# Patient Record
Sex: MALE | Race: WHITE | Employment: FULL TIME | URBAN - METROPOLITAN AREA
[De-identification: names, ages, dates, MRNs, and addresses within clinical notes are randomized per-mention and may not be internally consistent; named-entity substitution may affect disease eponyms.]

---

## 2017-01-12 PROBLEM — E78.1 HYPERTRIGLYCERIDEMIA: Status: ACTIVE | Noted: 2017-01-12

## 2017-01-12 PROBLEM — E10.9 DIABETES MELLITUS TYPE 1, CONTROLLED, INSULIN DEPENDENT (HCC): Status: ACTIVE | Noted: 2017-01-12

## 2017-01-12 PROBLEM — K21.9 GASTROESOPHAGEAL REFLUX DISEASE: Status: ACTIVE | Noted: 2017-01-12

## 2018-04-06 ENCOUNTER — OFFICE VISIT (OUTPATIENT)
Dept: FAMILY MEDICINE CLINIC | Age: 38
End: 2018-04-06
Payer: COMMERCIAL

## 2018-04-06 ENCOUNTER — HOSPITAL ENCOUNTER (OUTPATIENT)
Age: 38
Discharge: HOME | End: 2018-04-08
Payer: COMMERCIAL

## 2018-04-06 VITALS
OXYGEN SATURATION: 98 % | HEART RATE: 87 BPM | WEIGHT: 233 LBS | BODY MASS INDEX: 31.56 KG/M2 | TEMPERATURE: 98.3 F | DIASTOLIC BLOOD PRESSURE: 84 MMHG | SYSTOLIC BLOOD PRESSURE: 120 MMHG | HEIGHT: 72 IN

## 2018-04-06 DIAGNOSIS — Z13.220 LIPID SCREENING: ICD-10-CM

## 2018-04-06 DIAGNOSIS — R53.83 FATIGUE, UNSPECIFIED TYPE: ICD-10-CM

## 2018-04-06 DIAGNOSIS — R52 BODY ACHES: ICD-10-CM

## 2018-04-06 DIAGNOSIS — K21.9 GASTROESOPHAGEAL REFLUX DISEASE, ESOPHAGITIS PRESENCE NOT SPECIFIED: ICD-10-CM

## 2018-04-06 DIAGNOSIS — E10.9 DIABETES MELLITUS TYPE 1, CONTROLLED, INSULIN DEPENDENT (HCC): Primary | ICD-10-CM

## 2018-04-06 DIAGNOSIS — E10.9 DIABETES MELLITUS TYPE 1, CONTROLLED, INSULIN DEPENDENT (HCC): ICD-10-CM

## 2018-04-06 DIAGNOSIS — R07.9 CHEST PAIN, UNSPECIFIED TYPE: ICD-10-CM

## 2018-04-06 DIAGNOSIS — R63.5 WEIGHT GAIN: ICD-10-CM

## 2018-04-06 DIAGNOSIS — J30.2 CHRONIC SEASONAL ALLERGIC RHINITIS, UNSPECIFIED TRIGGER: ICD-10-CM

## 2018-04-06 LAB
ALBUMIN SERPL-MCNC: 4.9 G/DL (ref 3.5–5.2)
ALP BLD-CCNC: 102 U/L (ref 40–129)
ALT SERPL-CCNC: 34 U/L (ref 0–40)
ANION GAP SERPL CALCULATED.3IONS-SCNC: 17 MMOL/L (ref 7–16)
AST SERPL-CCNC: 29 U/L (ref 0–39)
BASOPHILS ABSOLUTE: 0.03 E9/L (ref 0–0.2)
BASOPHILS RELATIVE PERCENT: 0.5 % (ref 0–2)
BILIRUB SERPL-MCNC: 0.4 MG/DL (ref 0–1.2)
BILIRUBIN DIRECT: <0.2 MG/DL (ref 0–0.3)
BILIRUBIN, INDIRECT: NORMAL MG/DL (ref 0–1)
BUN BLDV-MCNC: 13 MG/DL (ref 6–20)
C-REACTIVE PROTEIN: 0.2 MG/DL (ref 0–0.4)
CALCIUM SERPL-MCNC: 9.8 MG/DL (ref 8.6–10.2)
CHLORIDE BLD-SCNC: 103 MMOL/L (ref 98–107)
CHOLESTEROL, TOTAL: 213 MG/DL (ref 0–199)
CO2: 23 MMOL/L (ref 22–29)
CREAT SERPL-MCNC: 0.8 MG/DL (ref 0.7–1.2)
CREATININE URINE: 73 MG/DL (ref 40–278)
EOSINOPHILS ABSOLUTE: 0.08 E9/L (ref 0.05–0.5)
EOSINOPHILS RELATIVE PERCENT: 1.4 % (ref 0–6)
FERRITIN: 126 NG/ML
GFR AFRICAN AMERICAN: >60
GFR NON-AFRICAN AMERICAN: >60 ML/MIN/1.73
GLUCOSE BLD-MCNC: 71 MG/DL (ref 74–109)
HBA1C MFR BLD: 7.9 % (ref 4.8–5.9)
HCT VFR BLD CALC: 51.2 % (ref 37–54)
HDLC SERPL-MCNC: 34 MG/DL
HEMOGLOBIN: 16.6 G/DL (ref 12.5–16.5)
IMMATURE GRANULOCYTES #: 0.01 E9/L
IMMATURE GRANULOCYTES %: 0.2 % (ref 0–5)
INFLUENZA A ANTIGEN, POC: NEGATIVE
INFLUENZA B ANTIGEN, POC: NEGATIVE
IRON SATURATION: 29 % (ref 20–55)
IRON: 110 MCG/DL (ref 59–158)
LDL CHOLESTEROL CALCULATED: 130 MG/DL (ref 0–99)
LYMPHOCYTES ABSOLUTE: 1.99 E9/L (ref 1.5–4)
LYMPHOCYTES RELATIVE PERCENT: 34.7 % (ref 20–42)
MCH RBC QN AUTO: 30.3 PG (ref 26–35)
MCHC RBC AUTO-ENTMCNC: 32.4 % (ref 32–34.5)
MCV RBC AUTO: 93.6 FL (ref 80–99.9)
MICROALBUMIN UR-MCNC: <12 MG/L
MICROALBUMIN/CREAT UR-RTO: ABNORMAL (ref 0–30)
MONOCYTES ABSOLUTE: 0.54 E9/L (ref 0.1–0.95)
MONOCYTES RELATIVE PERCENT: 9.4 % (ref 2–12)
NEUTROPHILS ABSOLUTE: 3.09 E9/L (ref 1.8–7.3)
NEUTROPHILS RELATIVE PERCENT: 53.8 % (ref 43–80)
PDW BLD-RTO: 13 FL (ref 11.5–15)
PLATELET # BLD: 305 E9/L (ref 130–450)
PMV BLD AUTO: 9.6 FL (ref 7–12)
POTASSIUM SERPL-SCNC: 4.5 MMOL/L (ref 3.5–5)
RBC # BLD: 5.47 E12/L (ref 3.8–5.8)
RHEUMATOID FACTOR: <10 IU/ML (ref 0–13)
SEDIMENTATION RATE, ERYTHROCYTE: 2 MM/HR (ref 0–15)
SODIUM BLD-SCNC: 143 MMOL/L (ref 132–146)
T4 FREE: 1.17 NG/DL (ref 0.93–1.7)
TOTAL IRON BINDING CAPACITY: 378 MCG/DL (ref 250–450)
TOTAL PROTEIN: 7.8 G/DL (ref 6.4–8.3)
TRIGL SERPL-MCNC: 247 MG/DL (ref 0–149)
TSH SERPL DL<=0.05 MIU/L-ACNC: 1.96 UIU/ML (ref 0.27–4.2)
VLDLC SERPL CALC-MCNC: 49 MG/DL
WBC # BLD: 5.7 E9/L (ref 4.5–11.5)

## 2018-04-06 PROCEDURE — 82044 UR ALBUMIN SEMIQUANTITATIVE: CPT

## 2018-04-06 PROCEDURE — 85651 RBC SED RATE NONAUTOMATED: CPT

## 2018-04-06 PROCEDURE — 82306 VITAMIN D 25 HYDROXY: CPT

## 2018-04-06 PROCEDURE — 83540 ASSAY OF IRON: CPT

## 2018-04-06 PROCEDURE — 83036 HEMOGLOBIN GLYCOSYLATED A1C: CPT

## 2018-04-06 PROCEDURE — 84443 ASSAY THYROID STIM HORMONE: CPT

## 2018-04-06 PROCEDURE — 83550 IRON BINDING TEST: CPT

## 2018-04-06 PROCEDURE — 83735 ASSAY OF MAGNESIUM: CPT

## 2018-04-06 PROCEDURE — 80053 COMPREHEN METABOLIC PANEL: CPT

## 2018-04-06 PROCEDURE — 82728 ASSAY OF FERRITIN: CPT

## 2018-04-06 PROCEDURE — 80061 LIPID PANEL: CPT

## 2018-04-06 PROCEDURE — 99214 OFFICE O/P EST MOD 30 MIN: CPT | Performed by: FAMILY MEDICINE

## 2018-04-06 PROCEDURE — 85025 COMPLETE CBC W/AUTO DIFF WBC: CPT

## 2018-04-06 PROCEDURE — 86038 ANTINUCLEAR ANTIBODIES: CPT

## 2018-04-06 PROCEDURE — 82607 VITAMIN B-12: CPT

## 2018-04-06 PROCEDURE — 86431 RHEUMATOID FACTOR QUANT: CPT

## 2018-04-06 PROCEDURE — 82248 BILIRUBIN DIRECT: CPT

## 2018-04-06 PROCEDURE — 87804 INFLUENZA ASSAY W/OPTIC: CPT | Performed by: FAMILY MEDICINE

## 2018-04-06 PROCEDURE — 86140 C-REACTIVE PROTEIN: CPT

## 2018-04-06 PROCEDURE — 36415 COLL VENOUS BLD VENIPUNCTURE: CPT | Performed by: FAMILY MEDICINE

## 2018-04-06 PROCEDURE — 84439 ASSAY OF FREE THYROXINE: CPT

## 2018-04-06 PROCEDURE — 93000 ELECTROCARDIOGRAM COMPLETE: CPT | Performed by: FAMILY MEDICINE

## 2018-04-06 PROCEDURE — 82570 ASSAY OF URINE CREATININE: CPT

## 2018-04-06 RX ORDER — ESOMEPRAZOLE MAGNESIUM 20 MG/1
20 TABLET, DELAYED RELEASE ORAL DAILY
Qty: 30 TABLET | Refills: 2 | Status: SHIPPED | OUTPATIENT
Start: 2018-04-06 | End: 2018-10-05 | Stop reason: SDUPTHER

## 2018-04-06 RX ORDER — FLUTICASONE PROPIONATE 50 MCG
1 SPRAY, SUSPENSION (ML) NASAL DAILY
Qty: 1 BOTTLE | Refills: 3 | Status: SHIPPED | OUTPATIENT
Start: 2018-04-06 | End: 2018-10-05 | Stop reason: SDUPTHER

## 2018-04-06 ASSESSMENT — PATIENT HEALTH QUESTIONNAIRE - PHQ9
SUM OF ALL RESPONSES TO PHQ QUESTIONS 1-9: 0
2. FEELING DOWN, DEPRESSED OR HOPELESS: 0
SUM OF ALL RESPONSES TO PHQ9 QUESTIONS 1 & 2: 0
1. LITTLE INTEREST OR PLEASURE IN DOING THINGS: 0

## 2018-04-09 DIAGNOSIS — E78.2 MIXED HYPERLIPIDEMIA: Primary | ICD-10-CM

## 2018-04-09 DIAGNOSIS — M79.10 MYALGIA: Primary | ICD-10-CM

## 2018-04-09 LAB — ANTI-NUCLEAR ANTIBODY (ANA): NEGATIVE

## 2018-04-10 DIAGNOSIS — E55.9 VITAMIN D DEFICIENCY: Primary | ICD-10-CM

## 2018-04-10 LAB
MAGNESIUM: 2.5 MG/DL (ref 1.6–2.6)
VITAMIN B-12: 627 PG/ML (ref 211–946)
VITAMIN D 25-HYDROXY: 29 NG/ML (ref 30–100)

## 2018-04-26 ENCOUNTER — TELEPHONE (OUTPATIENT)
Dept: FAMILY MEDICINE CLINIC | Age: 38
End: 2018-04-26

## 2018-05-16 ENCOUNTER — TELEPHONE (OUTPATIENT)
Dept: FAMILY MEDICINE CLINIC | Age: 38
End: 2018-05-16

## 2018-07-18 ENCOUNTER — TELEPHONE (OUTPATIENT)
Dept: FAMILY MEDICINE CLINIC | Age: 38
End: 2018-07-18

## 2018-07-19 ENCOUNTER — HOSPITAL ENCOUNTER (EMERGENCY)
Age: 38
Discharge: HOME | End: 2018-07-19
Payer: COMMERCIAL

## 2018-07-19 VITALS
HEART RATE: 106 BPM | RESPIRATION RATE: 18 BRPM | HEIGHT: 72 IN | DIASTOLIC BLOOD PRESSURE: 80 MMHG | WEIGHT: 225 LBS | OXYGEN SATURATION: 96 % | BODY MASS INDEX: 30.48 KG/M2 | SYSTOLIC BLOOD PRESSURE: 127 MMHG | TEMPERATURE: 98.5 F

## 2018-07-19 DIAGNOSIS — S41.112A LACERATION OF LEFT UPPER EXTREMITY, INITIAL ENCOUNTER: Primary | ICD-10-CM

## 2018-07-19 PROCEDURE — 99282 EMERGENCY DEPT VISIT SF MDM: CPT

## 2018-07-19 PROCEDURE — 12001 RPR S/N/AX/GEN/TRNK 2.5CM/<: CPT

## 2018-07-19 ASSESSMENT — PAIN DESCRIPTION - PAIN TYPE: TYPE: ACUTE PAIN

## 2018-07-19 ASSESSMENT — PAIN DESCRIPTION - ORIENTATION: ORIENTATION: LEFT

## 2018-07-19 ASSESSMENT — PAIN DESCRIPTION - LOCATION: LOCATION: ARM

## 2018-07-20 NOTE — ED PROVIDER NOTES
[facility]      Department of Emergency Medicine  ED Provider Note  Admit Date: 7/19/2018  Room: [facility]            HPI:  7/19/18, Time: 10:06 PM  .       Hayley Paulino is a 34 y.o. old male presenting to the emergency department for a laceration to the left forearm, caused by metal edge, which occured 1 hour(s) prior to arrival. There is not a possibility of retained foreign body in the affected area. The patients tetanus status is within past 5 years. Bleeding is  controlled. There is no pain at injury site. The injury was work related. Review of Systems:   Pertinent positives and negatives are stated within HPI, all other systems reviewed and are negative.          --------------------------------------------- PAST HISTORY ---------------------------------------------  Past Medical History:  has a past medical history of Diabetes mellitus ([facility] [address] Past Surgical History:  has a past surgical history that includes Cholecystectomy. Social History:  reports that he has never smoked. His smokeless tobacco use includes Chew. He reports that he drinks alcohol. He reports that he does not use drugs. Family History: family history includes Diabetes in his father; High Blood Pressure in his father. The patients home medications have been reviewed. Allergies: Patient has no known allergies. -------------------------------------------------- RESULTS -------------------------------------------------  All laboratory and radiology results have been personally reviewed by myself   LABS:  No results found for this visit on 07/19/18. RADIOLOGY:  Interpreted by Radiologist.  No orders to display       ------------------------- NURSING NOTES AND VITALS REVIEWED ---------------------------   The nursing notes within the ED encounter and vital signs as below have been reviewed.    /80   Pulse 106   Temp 98.5 °F (36.9 °C)   Resp 18   Ht 6' (1.829 m)   Wt 225 lb (102.1 kg)   SpO2 96% advised to keep the wound clean and dry. He is to wash the wound twice a day with soap and water. He is to keep it covered at all times while at work. Return to the emergency department with any purulent drainage from the wound, surrounding redness, or lymphangitic streaking. Counseling: The emergency provider has spoken with the patient and discussed todays results, in addition to providing specific details for the plan of care and counseling regarding the diagnosis and prognosis. Questions are answered at this time and they are agreeable with the plan.      --------------------------------- IMPRESSION AND DISPOSITION ---------------------------------    IMPRESSION  1.  Laceration of left upper extremity, initial encounter        DISPOSITION  Disposition: Discharge to home  Patient condition is good         Evan Sosa, [place]  07/19/18 9170

## 2018-08-09 ENCOUNTER — TELEPHONE (OUTPATIENT)
Dept: FAMILY MEDICINE CLINIC | Age: 38
End: 2018-08-09

## 2018-08-13 ENCOUNTER — TELEPHONE (OUTPATIENT)
Dept: FAMILY MEDICINE CLINIC | Age: 38
End: 2018-08-13

## 2018-08-13 NOTE — TELEPHONE ENCOUNTER
Spoke with patient - due for labs- he is currently in [facility] and will call us to schedule an appt when he gets back to [facility].

## 2018-09-28 ENCOUNTER — TELEPHONE (OUTPATIENT)
Dept: FAMILY MEDICINE CLINIC | Age: 38
End: 2018-09-28

## 2018-10-05 ENCOUNTER — OFFICE VISIT (OUTPATIENT)
Dept: FAMILY MEDICINE CLINIC | Age: 38
End: 2018-10-05
Payer: COMMERCIAL

## 2018-10-05 ENCOUNTER — HOSPITAL ENCOUNTER (OUTPATIENT)
Age: 38
Discharge: HOME | End: 2018-10-07
Payer: COMMERCIAL

## 2018-10-05 VITALS
OXYGEN SATURATION: 98 % | WEIGHT: 227 LBS | HEIGHT: 72 IN | SYSTOLIC BLOOD PRESSURE: 122 MMHG | TEMPERATURE: 98.2 F | DIASTOLIC BLOOD PRESSURE: 76 MMHG | HEART RATE: 82 BPM | BODY MASS INDEX: 30.75 KG/M2

## 2018-10-05 DIAGNOSIS — Z23 NEED FOR INFLUENZA VACCINATION: ICD-10-CM

## 2018-10-05 DIAGNOSIS — E10.9 DIABETES MELLITUS TYPE 1, CONTROLLED, INSULIN DEPENDENT (HCC): ICD-10-CM

## 2018-10-05 DIAGNOSIS — Z23 NEED FOR VACCINATION: ICD-10-CM

## 2018-10-05 DIAGNOSIS — L30.9 DERMATITIS: Primary | ICD-10-CM

## 2018-10-05 DIAGNOSIS — K21.9 GASTROESOPHAGEAL REFLUX DISEASE, ESOPHAGITIS PRESENCE NOT SPECIFIED: ICD-10-CM

## 2018-10-05 DIAGNOSIS — E78.2 MIXED HYPERLIPIDEMIA: ICD-10-CM

## 2018-10-05 DIAGNOSIS — E11.9 ENCOUNTER FOR DIABETIC FOOT EXAM (HCC): ICD-10-CM

## 2018-10-05 DIAGNOSIS — J31.0 CHRONIC RHINITIS: ICD-10-CM

## 2018-10-05 LAB
ALBUMIN SERPL-MCNC: 4.7 G/DL (ref 3.5–5.2)
ALP BLD-CCNC: 127 U/L (ref 40–129)
ALT SERPL-CCNC: 22 U/L (ref 0–40)
ANION GAP SERPL CALCULATED.3IONS-SCNC: 17 MMOL/L (ref 7–16)
AST SERPL-CCNC: 31 U/L (ref 0–39)
BILIRUB SERPL-MCNC: 0.4 MG/DL (ref 0–1.2)
BILIRUBIN DIRECT: <0.2 MG/DL (ref 0–0.3)
BILIRUBIN, INDIRECT: NORMAL MG/DL (ref 0–1)
BUN BLDV-MCNC: 9 MG/DL (ref 6–20)
CALCIUM SERPL-MCNC: 9.9 MG/DL (ref 8.6–10.2)
CHLORIDE BLD-SCNC: 98 MMOL/L (ref 98–107)
CHOLESTEROL, TOTAL: 169 MG/DL (ref 0–199)
CO2: 24 MMOL/L (ref 22–29)
CREAT SERPL-MCNC: 0.8 MG/DL (ref 0.7–1.2)
CREATININE URINE: 77 MG/DL (ref 40–278)
GFR AFRICAN AMERICAN: >60
GFR NON-AFRICAN AMERICAN: >60 ML/MIN/1.73
GLUCOSE BLD-MCNC: 96 MG/DL (ref 74–109)
HBA1C MFR BLD: 7.9 % (ref 4–5.6)
HDLC SERPL-MCNC: 30 MG/DL
LDL CHOLESTEROL CALCULATED: 98 MG/DL (ref 0–99)
MICROALBUMIN UR-MCNC: <12 MG/L
MICROALBUMIN/CREAT UR-RTO: ABNORMAL (ref 0–30)
POTASSIUM SERPL-SCNC: 4.9 MMOL/L (ref 3.5–5)
SODIUM BLD-SCNC: 139 MMOL/L (ref 132–146)
TOTAL PROTEIN: 7.7 G/DL (ref 6.4–8.3)
TRIGL SERPL-MCNC: 204 MG/DL (ref 0–149)
VLDLC SERPL CALC-MCNC: 41 MG/DL

## 2018-10-05 PROCEDURE — 90471 IMMUNIZATION ADMIN: CPT | Performed by: FAMILY MEDICINE

## 2018-10-05 PROCEDURE — 90686 IIV4 VACC NO PRSV 0.5 ML IM: CPT | Performed by: FAMILY MEDICINE

## 2018-10-05 PROCEDURE — 80048 BASIC METABOLIC PNL TOTAL CA: CPT

## 2018-10-05 PROCEDURE — 83036 HEMOGLOBIN GLYCOSYLATED A1C: CPT

## 2018-10-05 PROCEDURE — 82570 ASSAY OF URINE CREATININE: CPT

## 2018-10-05 PROCEDURE — 80061 LIPID PANEL: CPT

## 2018-10-05 PROCEDURE — 82044 UR ALBUMIN SEMIQUANTITATIVE: CPT

## 2018-10-05 PROCEDURE — 99214 OFFICE O/P EST MOD 30 MIN: CPT | Performed by: FAMILY MEDICINE

## 2018-10-05 PROCEDURE — 80076 HEPATIC FUNCTION PANEL: CPT

## 2018-10-05 RX ORDER — ESOMEPRAZOLE MAGNESIUM 20 MG/1
20 TABLET, DELAYED RELEASE ORAL DAILY
Qty: 30 TABLET | Refills: 2 | Status: SHIPPED | OUTPATIENT
Start: 2018-10-05 | End: 2019-10-04 | Stop reason: DRUGHIGH

## 2018-10-05 RX ORDER — FLUTICASONE PROPIONATE 50 MCG
SPRAY, SUSPENSION (ML) NASAL
Qty: 1 BOTTLE | Refills: 3 | Status: SHIPPED | OUTPATIENT
Start: 2018-10-05 | End: 2019-10-04 | Stop reason: SDUPTHER

## 2018-10-05 RX ORDER — BLOOD-GLUCOSE SENSOR
EACH MISCELLANEOUS
Qty: 10 EACH | Refills: 1 | Status: CANCELLED | OUTPATIENT
Start: 2018-10-05

## 2018-10-05 NOTE — PROGRESS NOTES
dependent ([facility]  -     blood glucose test strips (ASCENSIA AUTODISC VI;ONE TOUCH ULTRA TEST VI) strip; Test strips to use 8-10 times a day for control of type 1 brittle diabetes  -     insulin lispro (HUMALOG) 100 UNIT/ML injection vial; use as directed for insulin pump-2. 5units/hr plus 4 to 5 boluses thru the day  -     [facility]- Miladys eG MD  -     Microalbumin / Creatinine Urine Ratio; Future  -     Hemoglobin A1C; Future    Need for influenza vaccination  -     INFLUENZA, QUADV, 3 YRS AND OLDER, IM, PF, PREFILL SYR OR SDV, 0.5ML (FLUZONE QUADV, PF)    Mixed hyperlipidemia  -     Basic Metabolic Panel; Future  -     Hepatic Function Panel; Future  -     Lipid Panel; Future    Encounter for diabetic foot exam ([facility]  -     Diabetic Foot Exam      Pt agreed to see endocrine   Patient states he will not agree to start a statin -he did agree to lipid testing today with labs   Return in 6 mn pending lab results. Plan discussed with the patient and his wife and they were in agreement with the plan of care. Bogdan ADAN

## 2018-11-13 DIAGNOSIS — E10.9 DIABETES MELLITUS TYPE 1, CONTROLLED, INSULIN DEPENDENT (HCC): ICD-10-CM

## 2018-11-13 NOTE — TELEPHONE ENCOUNTER
These got sent to mail away pharmacy, he always gets them at  in [place], can we please send to [facility]?

## 2019-02-05 ENCOUNTER — OFFICE VISIT (OUTPATIENT)
Dept: PRIMARY CARE CLINIC | Age: 39
End: 2019-02-05
Payer: COMMERCIAL

## 2019-02-05 VITALS
SYSTOLIC BLOOD PRESSURE: 110 MMHG | HEIGHT: 72 IN | DIASTOLIC BLOOD PRESSURE: 82 MMHG | TEMPERATURE: 97.7 F | BODY MASS INDEX: 31.97 KG/M2 | OXYGEN SATURATION: 97 % | HEART RATE: 88 BPM | WEIGHT: 236 LBS

## 2019-02-05 DIAGNOSIS — J40 BRONCHITIS: Primary | ICD-10-CM

## 2019-02-05 PROCEDURE — 99213 OFFICE O/P EST LOW 20 MIN: CPT | Performed by: PHYSICIAN ASSISTANT

## 2019-02-05 RX ORDER — GUAIFENESIN AND CODEINE PHOSPHATE 100; 10 MG/5ML; MG/5ML
5 SOLUTION ORAL EVERY 6 HOURS PRN
Qty: 180 ML | Refills: 0 | Status: SHIPPED | OUTPATIENT
Start: 2019-02-05 | End: 2019-02-12

## 2019-02-05 RX ORDER — PREDNISONE 20 MG/1
20 TABLET ORAL 2 TIMES DAILY
Qty: 10 TABLET | Refills: 0 | Status: SHIPPED | OUTPATIENT
Start: 2019-02-05 | End: 2019-02-10

## 2019-02-05 RX ORDER — DOXYCYCLINE HYCLATE 100 MG
100 TABLET ORAL 2 TIMES DAILY
Qty: 20 TABLET | Refills: 0 | Status: SHIPPED | OUTPATIENT
Start: 2019-02-05 | End: 2019-02-15

## 2019-04-12 ENCOUNTER — OFFICE VISIT (OUTPATIENT)
Dept: PRIMARY CARE CLINIC | Age: 39
End: 2019-04-12
Payer: COMMERCIAL

## 2019-04-12 VITALS
TEMPERATURE: 97.2 F | OXYGEN SATURATION: 98 % | RESPIRATION RATE: 16 BRPM | DIASTOLIC BLOOD PRESSURE: 82 MMHG | WEIGHT: 235 LBS | BODY MASS INDEX: 31.87 KG/M2 | HEART RATE: 107 BPM | SYSTOLIC BLOOD PRESSURE: 138 MMHG

## 2019-04-12 DIAGNOSIS — E55.9 VITAMIN D DEFICIENCY: ICD-10-CM

## 2019-04-12 DIAGNOSIS — E10.9 DIABETES MELLITUS TYPE 1, CONTROLLED, INSULIN DEPENDENT (HCC): Primary | ICD-10-CM

## 2019-04-12 DIAGNOSIS — E78.2 MIXED HYPERLIPIDEMIA: ICD-10-CM

## 2019-04-12 DIAGNOSIS — K21.9 GASTROESOPHAGEAL REFLUX DISEASE, ESOPHAGITIS PRESENCE NOT SPECIFIED: ICD-10-CM

## 2019-04-12 PROCEDURE — 99214 OFFICE O/P EST MOD 30 MIN: CPT | Performed by: PHYSICIAN ASSISTANT

## 2019-04-12 ASSESSMENT — PATIENT HEALTH QUESTIONNAIRE - PHQ9
SUM OF ALL RESPONSES TO PHQ QUESTIONS 1-9: 0
SUM OF ALL RESPONSES TO PHQ QUESTIONS 1-9: 0
2. FEELING DOWN, DEPRESSED OR HOPELESS: 0
SUM OF ALL RESPONSES TO PHQ9 QUESTIONS 1 & 2: 0
1. LITTLE INTEREST OR PLEASURE IN DOING THINGS: 0

## 2019-04-12 NOTE — PROGRESS NOTES
Scribner Bodily  1980 4/12/19      HPI:    Patient presents for T1DM check up. Last A1c was 7.9%. Does have Hx of retinopathy in R eye, sees vitreoretinal specialist routinely. No neuropathy, no nephropathy. Checking his sugars he states around 20 times a day. Basal 2.10 units/hr, and bolus he typically will take 2.5 units per 15 grams of carbohydrates, with ISF of ?. He isn't sure. He isn't sure how to show me on his pump, but feels it may be around 1 unit per 50 points. He would like a new pump, he has [employer] mini[employer], current one is 3years old. State it will malfunction at times, and he is physical with his job. He has increased his basal insulin on a small increments, as he states at times his sugars are a little more high in AM, have been in 200's lately. States at times they do go lower in 130s, notes since illness they have been a little high, but again is working on setting. He is compliant with his pump, but states not so much with diet. He is on PPI for GERD, takes OTC Nexium, controls his Sx. He is also on flonase, QD for allergies and it helps. Hx of Vit D deficiency, he hasn't been on it. Past Medical History:   Diagnosis Date    Diabetes mellitus ([address]     Diabetic retinopathy ([facility] [address]         Past Surgical History:   Procedure Laterality Date    CHOLECYSTECTOMY         Current Outpatient Medications   Medication Sig Dispense Refill    blood glucose test strips (ASCENSIA AUTODISC VI;ONE TOUCH ULTRA TEST VI) strip Test strips to use 8-10 times a day for control of type 1 brittle diabetes 900 each 2    Esomeprazole Magnesium 20 MG TBEC Take 20 mg by mouth daily 30 tablet 2    fluticasone (FLONASE) 50 MCG/ACT nasal spray One spray into each nostril once daily 1 Bottle 3    insulin lispro (HUMALOG) 100 UNIT/ML injection vial use as directed for insulin pump-2. 5units/hr plus 4 to 5 boluses thru the day 16 vial 2    Continuous Glucose Monitor Sup (ENLITE GLUCOSE SENSOR) MISC as directed q3 days dx: e11.9 10 each 1    Insulin Infusion Pump KIT by Does not apply route       No current facility-administered medications for this visit. No Known Allergies    Family History   Problem Relation Age of Onset    High Blood Pressure Father     Diabetes Father     Diabetes Sister     Liver Disease Maternal Grandmother         cirrhosis, nonalcoholic    Cancer Paternal Grandmother         lung, smoker       Social History     Socioeconomic History    Marital status:      Spouse name: Not on file    Number of children: Not on file    Years of education: Not on file    Highest education level: Not on file   Occupational History    Not on file   Social Needs    Financial resource strain: Not on file    Food insecurity:     Worry: Not on file     Inability: Not on file    Transportation needs:     Medical: Not on file     Non-medical: Not on file   Tobacco Use    Smoking status: Never Smoker    Smokeless tobacco: Current User     Types: Chew   Substance and Sexual Activity    Alcohol use: Yes     Comment: rare    Drug use: No    Sexual activity: Not on file   Lifestyle    Physical activity:     Days per week: Not on file     Minutes per session: Not on file    Stress: Not on file   Relationships    Social connections:     Talks on phone: Not on file     Gets together: Not on file     Attends Orthodox service: Not on file     Active member of club or organization: Not on file     Attends meetings of clubs or organizations: Not on file     Relationship status: Not on file    Intimate partner violence:     Fear of current or ex partner: Not on file     Emotionally abused: Not on file     Physically abused: Not on file     Forced sexual activity: Not on file   Other Topics Concern    Not on file   Social History Narrative     [place] [employer], , 3 children.         Review of Systems :    Constitutional: Negative for fatigue, malaise, fever, chills, to the medical problem. Physical Exam:   Vitals:    04/12/19 1627   BP: 138/82   Pulse: 107   Resp: 16   Temp: 97.2 °F (36.2 °C)   SpO2: 98%   Weight: 235 lb (106.6 kg)     Constitutional:  A and O x 3, in NAD. Afebrile. Appears stated age. Head:  NCAT. Eyes:  PERRLA, EOMI without nystagmus. Conjunctiva normal. Sclera anicteric. Ears:  External ears without lesions. TM's clear bilaterally. Throat:  Pharynx without lesions. Airway patient. Mucous membranes pink and moist without lesions. Neck:  Supple. Nontender, no lymphadenopathy noted, no thyromegaly. Lungs:  Clear to auscultation and breath sounds equal.  Heart:  Regular rate and rhythm, normal S1 and S2, no m/r/g.  UE and LE distal pulses intact. Abdomen:  Soft, NTND, NABS x 4, no masses or organomegaly, no rebound or guarding. MS: Normal, painless range of motion. No neurovascular deficit. 5/5 strength in UE's/LE's/ bilaterally. Skin:  No abrasions, ecchymoses, or lacerations unless noted elsewhere. Extremities  No cyanosis, clubbing, or edema noted. Neurological:   Oriented. Motor functions intact. DTRs UE and LE intact. Assessment/Plan:     Yair Ramon was seen today for diabetes. Diagnoses and all orders for this visit:    Diabetes mellitus type 1, controlled, insulin dependent ([facility] [address]  -     COMPREHENSIVE METABOLIC PANEL; Future  -     HEMOGLOBIN A1C; Future    Mixed hyperlipidemia  -     COMPREHENSIVE METABOLIC PANEL; Future  -     LIPID PANEL; Future    Gastroesophageal reflux disease, esophagitis presence not specified    Vitamin D deficiency  -     Vitamin D 25 Hydrox, D2 & D3; Future    continue pump settings the same for now, try and keep a log of sugars. Will get all labs as above, and await results. Return in about 6 months (around 10/12/2019). sooner if needed. Counseled regarding above diagnosis, including possible risks and complications, especially if left uncontrolled.      Counseled

## 2019-04-18 ENCOUNTER — HOSPITAL ENCOUNTER (OUTPATIENT)
Age: 39
Discharge: HOME | End: 2019-04-20
Payer: COMMERCIAL

## 2019-04-18 ENCOUNTER — NURSE ONLY (OUTPATIENT)
Dept: PRIMARY CARE CLINIC | Age: 39
End: 2019-04-18

## 2019-04-18 DIAGNOSIS — E78.2 MIXED HYPERLIPIDEMIA: Primary | ICD-10-CM

## 2019-04-18 DIAGNOSIS — E55.9 VITAMIN D DEFICIENCY: ICD-10-CM

## 2019-04-18 DIAGNOSIS — E78.2 MIXED HYPERLIPIDEMIA: ICD-10-CM

## 2019-04-18 DIAGNOSIS — E10.9 DIABETES MELLITUS TYPE 1, CONTROLLED, INSULIN DEPENDENT (HCC): ICD-10-CM

## 2019-04-18 LAB
ALBUMIN SERPL-MCNC: 4.6 G/DL (ref 3.5–5.2)
ALP BLD-CCNC: 110 U/L (ref 40–129)
ALT SERPL-CCNC: 24 U/L (ref 0–40)
ANION GAP SERPL CALCULATED.3IONS-SCNC: 14 MMOL/L (ref 7–16)
AST SERPL-CCNC: 24 U/L (ref 0–39)
BILIRUB SERPL-MCNC: 0.3 MG/DL (ref 0–1.2)
BUN BLDV-MCNC: 12 MG/DL (ref 6–20)
CALCIUM SERPL-MCNC: 9.5 MG/DL (ref 8.6–10.2)
CHLORIDE BLD-SCNC: 102 MMOL/L (ref 98–107)
CHOLESTEROL, TOTAL: 168 MG/DL (ref 0–199)
CO2: 24 MMOL/L (ref 22–29)
CREAT SERPL-MCNC: 0.8 MG/DL (ref 0.7–1.2)
GFR AFRICAN AMERICAN: >60
GFR NON-AFRICAN AMERICAN: >60 ML/MIN/1.73
GLUCOSE BLD-MCNC: 169 MG/DL (ref 74–99)
HBA1C MFR BLD: 7.8 % (ref 4–5.6)
HDLC SERPL-MCNC: 29 MG/DL
LDL CHOLESTEROL CALCULATED: 92 MG/DL (ref 0–99)
POTASSIUM SERPL-SCNC: 4.2 MMOL/L (ref 3.5–5)
SODIUM BLD-SCNC: 140 MMOL/L (ref 132–146)
TOTAL PROTEIN: 7.4 G/DL (ref 6.4–8.3)
TRIGL SERPL-MCNC: 236 MG/DL (ref 0–149)
VITAMIN D 25-HYDROXY: 49 NG/ML (ref 30–100)
VLDLC SERPL CALC-MCNC: 47 MG/DL

## 2019-04-18 PROCEDURE — 82306 VITAMIN D 25 HYDROXY: CPT

## 2019-04-18 PROCEDURE — 80061 LIPID PANEL: CPT

## 2019-04-18 PROCEDURE — 83036 HEMOGLOBIN GLYCOSYLATED A1C: CPT

## 2019-04-18 PROCEDURE — 80053 COMPREHEN METABOLIC PANEL: CPT

## 2019-04-23 ENCOUNTER — TELEPHONE (OUTPATIENT)
Dept: PRIMARY CARE CLINIC | Age: 39
End: 2019-04-23

## 2019-04-23 RX ORDER — ICOSAPENT ETHYL 1000 MG/1
2 CAPSULE ORAL 2 TIMES DAILY
Qty: 120 CAPSULE | Refills: 5 | Status: SHIPPED | OUTPATIENT
Start: 2019-04-23 | End: 2019-07-10

## 2019-04-23 NOTE — TELEPHONE ENCOUNTER
OK.  I actually set aside samples for him to take, it will be 2 capsules BID, and then a scripts with a copay card for decreased copay in case it should be costly. He can  at the pharmacy.

## 2019-06-04 RX ORDER — DOXYCYCLINE HYCLATE 100 MG
100 TABLET ORAL 2 TIMES DAILY
Qty: 20 TABLET | Refills: 0 | Status: SHIPPED | OUTPATIENT
Start: 2019-06-04 | End: 2019-06-14

## 2019-07-10 ENCOUNTER — TELEPHONE (OUTPATIENT)
Dept: ADMINISTRATIVE | Age: 39
End: 2019-07-10

## 2019-07-10 ENCOUNTER — APPOINTMENT (OUTPATIENT)
Dept: GENERAL RADIOLOGY | Age: 39
End: 2019-07-10
Payer: COMMERCIAL

## 2019-07-10 ENCOUNTER — HOSPITAL ENCOUNTER (EMERGENCY)
Age: 39
Discharge: HOME | End: 2019-07-11
Attending: EMERGENCY MEDICINE
Payer: COMMERCIAL

## 2019-07-10 DIAGNOSIS — K21.9 GASTROESOPHAGEAL REFLUX DISEASE, ESOPHAGITIS PRESENCE NOT SPECIFIED: Primary | ICD-10-CM

## 2019-07-10 DIAGNOSIS — R07.89 ATYPICAL CHEST PAIN: ICD-10-CM

## 2019-07-10 LAB
ALBUMIN SERPL-MCNC: 4.7 G/DL (ref 3.5–5.2)
ALP BLD-CCNC: 120 U/L (ref 40–129)
ALT SERPL-CCNC: 29 U/L (ref 0–40)
ANION GAP SERPL CALCULATED.3IONS-SCNC: 11 MMOL/L (ref 7–16)
AST SERPL-CCNC: 23 U/L (ref 0–39)
BASOPHILS ABSOLUTE: 0.03 E9/L (ref 0–0.2)
BASOPHILS RELATIVE PERCENT: 0.4 % (ref 0–2)
BILIRUB SERPL-MCNC: 0.3 MG/DL (ref 0–1.2)
BILIRUBIN DIRECT: <0.2 MG/DL (ref 0–0.3)
BILIRUBIN URINE: NEGATIVE
BILIRUBIN, INDIRECT: NORMAL MG/DL (ref 0–1)
BLOOD, URINE: NEGATIVE
BUN BLDV-MCNC: 13 MG/DL (ref 6–20)
CALCIUM SERPL-MCNC: 9.5 MG/DL (ref 8.6–10.2)
CHLORIDE BLD-SCNC: 102 MMOL/L (ref 98–107)
CLARITY: CLEAR
CO2: 25 MMOL/L (ref 22–29)
COLOR: YELLOW
CREAT SERPL-MCNC: 0.9 MG/DL (ref 0.7–1.2)
D DIMER: <200 NG/ML DDU
EOSINOPHILS ABSOLUTE: 0.09 E9/L (ref 0.05–0.5)
EOSINOPHILS RELATIVE PERCENT: 1.2 % (ref 0–6)
GFR AFRICAN AMERICAN: >60
GFR NON-AFRICAN AMERICAN: >60 ML/MIN/1.73
GLUCOSE BLD-MCNC: 214 MG/DL (ref 74–99)
GLUCOSE URINE: 100 MG/DL
HCT VFR BLD CALC: 45.5 % (ref 37–54)
HEMOGLOBIN: 15.6 G/DL (ref 12.5–16.5)
IMMATURE GRANULOCYTES #: 0.02 E9/L
IMMATURE GRANULOCYTES %: 0.3 % (ref 0–5)
KETONES, URINE: NEGATIVE MG/DL
LEUKOCYTE ESTERASE, URINE: NEGATIVE
LIPASE: 12 U/L (ref 13–60)
LYMPHOCYTES ABSOLUTE: 2.06 E9/L (ref 1.5–4)
LYMPHOCYTES RELATIVE PERCENT: 26.6 % (ref 20–42)
MCH RBC QN AUTO: 31.6 PG (ref 26–35)
MCHC RBC AUTO-ENTMCNC: 34.3 % (ref 32–34.5)
MCV RBC AUTO: 92.1 FL (ref 80–99.9)
MONOCYTES ABSOLUTE: 0.51 E9/L (ref 0.1–0.95)
MONOCYTES RELATIVE PERCENT: 6.6 % (ref 2–12)
NEUTROPHILS ABSOLUTE: 5.02 E9/L (ref 1.8–7.3)
NEUTROPHILS RELATIVE PERCENT: 64.9 % (ref 43–80)
NITRITE, URINE: NEGATIVE
PDW BLD-RTO: 13.2 FL (ref 11.5–15)
PH UA: 7 (ref 5–9)
PLATELET # BLD: 315 E9/L (ref 130–450)
PMV BLD AUTO: 9 FL (ref 7–12)
POTASSIUM SERPL-SCNC: 4.6 MMOL/L (ref 3.5–5)
PROTEIN UA: NEGATIVE MG/DL
RBC # BLD: 4.94 E12/L (ref 3.8–5.8)
SODIUM BLD-SCNC: 138 MMOL/L (ref 132–146)
SPECIFIC GRAVITY UA: 1.01 (ref 1–1.03)
TOTAL PROTEIN: 7.5 G/DL (ref 6.4–8.3)
TROPONIN: <0.01 NG/ML (ref 0–0.03)
UROBILINOGEN, URINE: 0.2 E.U./DL
WBC # BLD: 7.7 E9/L (ref 4.5–11.5)

## 2019-07-10 PROCEDURE — 2580000003 HC RX 258: Performed by: EMERGENCY MEDICINE

## 2019-07-10 PROCEDURE — 71045 X-RAY EXAM CHEST 1 VIEW: CPT

## 2019-07-10 PROCEDURE — 80076 HEPATIC FUNCTION PANEL: CPT

## 2019-07-10 PROCEDURE — 6370000000 HC RX 637 (ALT 250 FOR IP): Performed by: EMERGENCY MEDICINE

## 2019-07-10 PROCEDURE — 81003 URINALYSIS AUTO W/O SCOPE: CPT

## 2019-07-10 PROCEDURE — 85025 COMPLETE CBC W/AUTO DIFF WBC: CPT

## 2019-07-10 PROCEDURE — 83690 ASSAY OF LIPASE: CPT

## 2019-07-10 PROCEDURE — 84484 ASSAY OF TROPONIN QUANT: CPT

## 2019-07-10 PROCEDURE — 99285 EMERGENCY DEPT VISIT HI MDM: CPT

## 2019-07-10 PROCEDURE — 80048 BASIC METABOLIC PNL TOTAL CA: CPT

## 2019-07-10 PROCEDURE — 85378 FIBRIN DEGRADE SEMIQUANT: CPT

## 2019-07-10 PROCEDURE — 36415 COLL VENOUS BLD VENIPUNCTURE: CPT

## 2019-07-10 RX ORDER — NITROGLYCERIN 0.4 MG/1
0.4 TABLET SUBLINGUAL EVERY 5 MIN PRN
Status: DISCONTINUED | OUTPATIENT
Start: 2019-07-10 | End: 2019-07-11 | Stop reason: HOSPADM

## 2019-07-10 RX ORDER — 0.9 % SODIUM CHLORIDE 0.9 %
500 INTRAVENOUS SOLUTION INTRAVENOUS ONCE
Status: COMPLETED | OUTPATIENT
Start: 2019-07-10 | End: 2019-07-10

## 2019-07-10 RX ORDER — ASPIRIN 81 MG/1
324 TABLET, CHEWABLE ORAL ONCE
Status: COMPLETED | OUTPATIENT
Start: 2019-07-10 | End: 2019-07-10

## 2019-07-10 RX ADMIN — ASPIRIN 81 MG 324 MG: 81 TABLET ORAL at 20:40

## 2019-07-10 RX ADMIN — SODIUM CHLORIDE 500 ML: 9 INJECTION, SOLUTION INTRAVENOUS at 20:40

## 2019-07-11 VITALS
WEIGHT: 230 LBS | HEIGHT: 72 IN | OXYGEN SATURATION: 98 % | HEART RATE: 84 BPM | RESPIRATION RATE: 16 BRPM | TEMPERATURE: 98.6 F | BODY MASS INDEX: 31.15 KG/M2 | SYSTOLIC BLOOD PRESSURE: 135 MMHG | DIASTOLIC BLOOD PRESSURE: 79 MMHG

## 2019-07-11 LAB — TROPONIN: <0.01 NG/ML (ref 0–0.03)

## 2019-07-11 PROCEDURE — 84484 ASSAY OF TROPONIN QUANT: CPT

## 2019-07-11 PROCEDURE — 6370000000 HC RX 637 (ALT 250 FOR IP): Performed by: EMERGENCY MEDICINE

## 2019-07-11 RX ORDER — LIDOCAINE HYDROCHLORIDE 20 MG/ML
SOLUTION OROPHARYNGEAL
Status: DISCONTINUED
Start: 2019-07-11 | End: 2019-07-11 | Stop reason: HOSPADM

## 2019-07-11 RX ORDER — MAGNESIUM HYDROXIDE/ALUMINUM HYDROXICE/SIMETHICONE 120; 1200; 1200 MG/30ML; MG/30ML; MG/30ML
SUSPENSION ORAL
Status: DISCONTINUED
Start: 2019-07-11 | End: 2019-07-11 | Stop reason: HOSPADM

## 2019-07-11 RX ADMIN — LIDOCAINE HYDROCHLORIDE: 20 SOLUTION ORAL; TOPICAL at 00:55

## 2019-07-11 ASSESSMENT — PAIN SCALES - GENERAL: PAINLEVEL_OUTOF10: 2

## 2019-07-11 ASSESSMENT — PAIN DESCRIPTION - PROGRESSION: CLINICAL_PROGRESSION: GRADUALLY IMPROVING

## 2019-07-14 LAB
EKG ATRIAL RATE: 98 BPM
EKG P AXIS: 42 DEGREES
EKG P-R INTERVAL: 160 MS
EKG Q-T INTERVAL: 332 MS
EKG QRS DURATION: 104 MS
EKG QTC CALCULATION (BAZETT): 423 MS
EKG R AXIS: 42 DEGREES
EKG T AXIS: 55 DEGREES
EKG VENTRICULAR RATE: 98 BPM

## 2019-10-04 ENCOUNTER — OFFICE VISIT (OUTPATIENT)
Dept: PRIMARY CARE CLINIC | Age: 39
End: 2019-10-04
Payer: COMMERCIAL

## 2019-10-04 VITALS
HEART RATE: 94 BPM | WEIGHT: 234 LBS | BODY MASS INDEX: 31.74 KG/M2 | TEMPERATURE: 96.6 F | DIASTOLIC BLOOD PRESSURE: 82 MMHG | OXYGEN SATURATION: 98 % | SYSTOLIC BLOOD PRESSURE: 124 MMHG

## 2019-10-04 DIAGNOSIS — K21.9 GASTROESOPHAGEAL REFLUX DISEASE, ESOPHAGITIS PRESENCE NOT SPECIFIED: ICD-10-CM
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
## 2019-10-04 DIAGNOSIS — F41.9 ANXIETY: ICD-10-CM

## 2019-10-04 DIAGNOSIS — E10.9 DIABETES MELLITUS TYPE 1, CONTROLLED, INSULIN DEPENDENT (HCC): ICD-10-CM

## 2019-10-04 DIAGNOSIS — J31.0 CHRONIC RHINITIS: ICD-10-CM

## 2019-10-04 DIAGNOSIS — Z23 NEED FOR INFLUENZA VACCINATION: Primary | ICD-10-CM

## 2019-10-04 DIAGNOSIS — E78.2 MIXED HYPERLIPIDEMIA: ICD-10-CM

## 2019-10-04 PROCEDURE — 99214 OFFICE O/P EST MOD 30 MIN: CPT | Performed by: PHYSICIAN ASSISTANT

## 2019-10-04 PROCEDURE — 90686 IIV4 VACC NO PRSV 0.5 ML IM: CPT | Performed by: PHYSICIAN ASSISTANT

## 2019-10-04 PROCEDURE — 90471 IMMUNIZATION ADMIN: CPT | Performed by: PHYSICIAN ASSISTANT

## 2019-10-04 RX ORDER — ESOMEPRAZOLE MAGNESIUM 40 MG/1
40 CAPSULE, DELAYED RELEASE ORAL
Qty: 90 CAPSULE | Refills: 1 | Status: SHIPPED | OUTPATIENT
Start: 2019-10-04 | End: 2020-01-13 | Stop reason: SDUPTHER

## 2019-10-04 RX ORDER — FLUTICASONE PROPIONATE 50 MCG
SPRAY, SUSPENSION (ML) NASAL
Qty: 3 BOTTLE | Refills: 1 | Status: SHIPPED | OUTPATIENT
Start: 2019-10-04 | End: 2020-01-13 | Stop reason: SDUPTHER

## 2019-10-04 RX ORDER — ESOMEPRAZOLE MAGNESIUM 20 MG/1
20 TABLET, DELAYED RELEASE ORAL DAILY
Qty: 30 TABLET | Refills: 2 | Status: CANCELLED | OUTPATIENT
Start: 2019-10-04

## 2019-10-04 RX ORDER — ESCITALOPRAM OXALATE 10 MG/1
10 TABLET ORAL DAILY
Qty: 30 TABLET | Refills: 2 | Status: SHIPPED | OUTPATIENT
Start: 2019-10-04 | End: 2020-01-13 | Stop reason: ALTCHOICE

## 2019-10-07 ENCOUNTER — OFFICE VISIT (OUTPATIENT)
Dept: PRIMARY CARE CLINIC | Age: 39
End: 2019-10-07
Payer: COMMERCIAL

## 2019-10-07 VITALS
TEMPERATURE: 98.2 F | WEIGHT: 229 LBS | DIASTOLIC BLOOD PRESSURE: 76 MMHG | SYSTOLIC BLOOD PRESSURE: 120 MMHG | OXYGEN SATURATION: 98 % | HEART RATE: 72 BPM | BODY MASS INDEX: 31.06 KG/M2

## 2019-10-07 DIAGNOSIS — R42 VERTIGO: Primary | ICD-10-CM

## 2019-10-07 PROCEDURE — 99213 OFFICE O/P EST LOW 20 MIN: CPT | Performed by: PHYSICIAN ASSISTANT

## 2019-10-07 RX ORDER — MECLIZINE HYDROCHLORIDE 25 MG/1
TABLET ORAL
Qty: 30 TABLET | Refills: 0 | Status: SHIPPED
Start: 2019-10-07 | End: 2022-02-24

## 2019-10-10 ENCOUNTER — HOSPITAL ENCOUNTER (OUTPATIENT)
Age: 39
Discharge: HOME | End: 2019-10-12
Payer: COMMERCIAL

## 2019-10-10 ENCOUNTER — NURSE ONLY (OUTPATIENT)
Dept: PRIMARY CARE CLINIC | Age: 39
End: 2019-10-10

## 2019-10-10 DIAGNOSIS — E78.2 MIXED HYPERLIPIDEMIA: ICD-10-CM

## 2019-10-10 DIAGNOSIS — J31.0 CHRONIC RHINITIS: ICD-10-CM

## 2019-10-10 DIAGNOSIS — Z23 NEED FOR INFLUENZA VACCINATION: ICD-10-CM

## 2019-10-10 DIAGNOSIS — K21.9 GASTROESOPHAGEAL REFLUX DISEASE, ESOPHAGITIS PRESENCE NOT SPECIFIED: ICD-10-CM

## 2019-10-10 DIAGNOSIS — E78.2 MIXED HYPERLIPIDEMIA: Primary | ICD-10-CM
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
## 2019-10-10 DIAGNOSIS — E10.9 DIABETES MELLITUS TYPE 1, CONTROLLED, INSULIN DEPENDENT (HCC): ICD-10-CM

## 2019-10-10 LAB
ALBUMIN SERPL-MCNC: 4.8 G/DL (ref 3.5–5.2)
ALP BLD-CCNC: 132 U/L (ref 40–129)
ALT SERPL-CCNC: 27 U/L (ref 0–40)
ANION GAP SERPL CALCULATED.3IONS-SCNC: 14 MMOL/L (ref 7–16)
AST SERPL-CCNC: 19 U/L (ref 0–39)
BILIRUB SERPL-MCNC: 0.4 MG/DL (ref 0–1.2)
BUN BLDV-MCNC: 13 MG/DL (ref 6–20)
CALCIUM SERPL-MCNC: 9.9 MG/DL (ref 8.6–10.2)
CHLORIDE BLD-SCNC: 99 MMOL/L (ref 98–107)
CHOLESTEROL, TOTAL: 184 MG/DL (ref 0–199)
CO2: 26 MMOL/L (ref 22–29)
CREAT SERPL-MCNC: 0.8 MG/DL (ref 0.7–1.2)
CREATININE URINE: 38 MG/DL (ref 40–278)
GFR AFRICAN AMERICAN: >60
GFR NON-AFRICAN AMERICAN: >60 ML/MIN/1.73
GLUCOSE BLD-MCNC: 244 MG/DL (ref 74–99)
HBA1C MFR BLD: 7.8 % (ref 4–5.6)
HCT VFR BLD CALC: 51.8 % (ref 37–54)
HDLC SERPL-MCNC: 30 MG/DL
HEMOGLOBIN: 16.6 G/DL (ref 12.5–16.5)
LDL CHOLESTEROL CALCULATED: 92 MG/DL (ref 0–99)
MCH RBC QN AUTO: 30.1 PG (ref 26–35)
MCHC RBC AUTO-ENTMCNC: 32 % (ref 32–34.5)
MCV RBC AUTO: 93.8 FL (ref 80–99.9)
MICROALBUMIN UR-MCNC: <12 MG/L
MICROALBUMIN/CREAT UR-RTO: ABNORMAL (ref 0–30)
PDW BLD-RTO: 12.8 FL (ref 11.5–15)
PLATELET # BLD: 346 E9/L (ref 130–450)
PMV BLD AUTO: 9.6 FL (ref 7–12)
POTASSIUM SERPL-SCNC: 4.5 MMOL/L (ref 3.5–5)
RBC # BLD: 5.52 E12/L (ref 3.8–5.8)
SODIUM BLD-SCNC: 139 MMOL/L (ref 132–146)
TOTAL PROTEIN: 7.9 G/DL (ref 6.4–8.3)
TRIGL SERPL-MCNC: 310 MG/DL (ref 0–149)
VLDLC SERPL CALC-MCNC: 62 MG/DL
WBC # BLD: 5.4 E9/L (ref 4.5–11.5)

## 2019-10-10 PROCEDURE — 82570 ASSAY OF URINE CREATININE: CPT

## 2019-10-10 PROCEDURE — 83036 HEMOGLOBIN GLYCOSYLATED A1C: CPT

## 2019-10-10 PROCEDURE — 85027 COMPLETE CBC AUTOMATED: CPT

## 2019-10-10 PROCEDURE — 86618 LYME DISEASE ANTIBODY: CPT

## 2019-10-10 PROCEDURE — 82044 UR ALBUMIN SEMIQUANTITATIVE: CPT

## 2019-10-10 PROCEDURE — 80053 COMPREHEN METABOLIC PANEL: CPT

## 2019-10-10 PROCEDURE — 80061 LIPID PANEL: CPT

## 2019-10-13 LAB — LYME, EIA: 0.55 LIV (ref 0–1.2)

## 2019-10-17 ENCOUNTER — TELEPHONE (OUTPATIENT)
Dept: PRIMARY CARE CLINIC | Age: 39
End: 2019-10-17

## 2019-10-17 DIAGNOSIS — R42 VERTIGO: Primary | ICD-10-CM

## 2019-11-22 DIAGNOSIS — E10.9 DIABETES MELLITUS TYPE 1, CONTROLLED, INSULIN DEPENDENT (HCC): ICD-10-CM

## 2020-01-13 ENCOUNTER — HOSPITAL ENCOUNTER (OUTPATIENT)
Age: 40
Discharge: HOME | End: 2020-01-15
Payer: COMMERCIAL

## 2020-01-13 ENCOUNTER — OFFICE VISIT (OUTPATIENT)
Dept: PRIMARY CARE CLINIC | Age: 40
End: 2020-01-13
Payer: COMMERCIAL

## 2020-01-13 VITALS
WEIGHT: 228 LBS | TEMPERATURE: 97.6 F | HEART RATE: 84 BPM | BODY MASS INDEX: 30.88 KG/M2 | SYSTOLIC BLOOD PRESSURE: 110 MMHG | HEIGHT: 72 IN | OXYGEN SATURATION: 98 % | DIASTOLIC BLOOD PRESSURE: 82 MMHG

## 2020-01-13 LAB
ALBUMIN SERPL-MCNC: 4.8 G/DL (ref 3.5–5.2)
ALP BLD-CCNC: 114 U/L (ref 40–129)
ALT SERPL-CCNC: 24 U/L (ref 0–40)
ANION GAP SERPL CALCULATED.3IONS-SCNC: 16 MMOL/L (ref 7–16)
AST SERPL-CCNC: 22 U/L (ref 0–39)
BILIRUB SERPL-MCNC: 0.6 MG/DL (ref 0–1.2)
BUN BLDV-MCNC: 16 MG/DL (ref 6–20)
CALCIUM SERPL-MCNC: 9.5 MG/DL (ref 8.6–10.2)
CHLORIDE BLD-SCNC: 101 MMOL/L (ref 98–107)
CHOLESTEROL, TOTAL: 212 MG/DL (ref 0–199)
CO2: 23 MMOL/L (ref 22–29)
CREAT SERPL-MCNC: 0.8 MG/DL (ref 0.7–1.2)
GFR AFRICAN AMERICAN: >60
GFR NON-AFRICAN AMERICAN: >60 ML/MIN/1.73
GLUCOSE BLD-MCNC: 156 MG/DL (ref 74–99)
HBA1C MFR BLD: 7.4 % (ref 4–5.6)
HCT VFR BLD CALC: 52.7 % (ref 37–54)
HDLC SERPL-MCNC: 40 MG/DL
HEMOGLOBIN: 16.6 G/DL (ref 12.5–16.5)
LDL CHOLESTEROL CALCULATED: 147 MG/DL (ref 0–99)
MCH RBC QN AUTO: 29.7 PG (ref 26–35)
MCHC RBC AUTO-ENTMCNC: 31.5 % (ref 32–34.5)
MCV RBC AUTO: 94.3 FL (ref 80–99.9)
PDW BLD-RTO: 13.4 FL (ref 11.5–15)
PLATELET # BLD: 329 E9/L (ref 130–450)
PMV BLD AUTO: 9.3 FL (ref 7–12)
POTASSIUM SERPL-SCNC: 4.7 MMOL/L (ref 3.5–5)
RBC # BLD: 5.59 E12/L (ref 3.8–5.8)
SODIUM BLD-SCNC: 140 MMOL/L (ref 132–146)
TOTAL PROTEIN: 7.6 G/DL (ref 6.4–8.3)
TRIGL SERPL-MCNC: 127 MG/DL (ref 0–149)
VLDLC SERPL CALC-MCNC: 25 MG/DL
WBC # BLD: 5.3 E9/L (ref 4.5–11.5)

## 2020-01-13 PROCEDURE — 80053 COMPREHEN METABOLIC PANEL: CPT

## 2020-01-13 PROCEDURE — 99214 OFFICE O/P EST MOD 30 MIN: CPT | Performed by: PHYSICIAN ASSISTANT

## 2020-01-13 PROCEDURE — 80061 LIPID PANEL: CPT

## 2020-01-13 PROCEDURE — 85027 COMPLETE CBC AUTOMATED: CPT

## 2020-01-13 PROCEDURE — 83036 HEMOGLOBIN GLYCOSYLATED A1C: CPT

## 2020-01-13 RX ORDER — CALCIUM CARBONATE 500(1250)
500 TABLET ORAL DAILY
COMMUNITY
End: 2022-02-24

## 2020-01-13 RX ORDER — BLOOD-GLUCOSE SENSOR
EACH MISCELLANEOUS
Qty: 9 EACH | Refills: 5 | Status: SHIPPED
Start: 2020-01-13 | End: 2020-06-19 | Stop reason: SDUPTHER

## 2020-01-13 RX ORDER — FLUTICASONE PROPIONATE 50 MCG
SPRAY, SUSPENSION (ML) NASAL
Qty: 3 BOTTLE | Refills: 1 | Status: SHIPPED
Start: 2020-01-13 | End: 2021-02-22

## 2020-01-13 RX ORDER — AMOXICILLIN AND CLAVULANATE POTASSIUM 875; 125 MG/1; MG/1
1 TABLET, FILM COATED ORAL 2 TIMES DAILY
Qty: 14 TABLET | Refills: 0 | Status: SHIPPED | OUTPATIENT
Start: 2020-01-13 | End: 2020-01-20

## 2020-01-13 RX ORDER — ESOMEPRAZOLE MAGNESIUM 40 MG/1
40 CAPSULE, DELAYED RELEASE ORAL
Qty: 90 CAPSULE | Refills: 1 | Status: SHIPPED | OUTPATIENT
Start: 2020-01-13

## 2020-01-13 RX ORDER — VILAZODONE HYDROCHLORIDE 10 MG/1
TABLET ORAL
Qty: 28 TABLET | Refills: 0 | COMMUNITY
Start: 2020-01-13 | End: 2022-02-24

## 2020-01-13 RX ORDER — BUPROPION HYDROCHLORIDE 150 MG/1
TABLET ORAL
Qty: 60 TABLET | Refills: 2 | Status: SHIPPED | OUTPATIENT
Start: 2020-01-13 | End: 2020-01-13 | Stop reason: CLARIF

## 2020-01-13 RX ORDER — BLOOD-GLUCOSE SENSOR
EACH MISCELLANEOUS
Qty: 10 EACH | Refills: 1 | Status: SHIPPED | OUTPATIENT
Start: 2020-01-13 | End: 2021-02-22 | Stop reason: SDUPTHER

## 2020-01-13 RX ORDER — M-VIT,TX,IRON,MINS/CALC/FOLIC 27MG-0.4MG
1 TABLET ORAL DAILY
COMMUNITY

## 2020-01-13 ASSESSMENT — PATIENT HEALTH QUESTIONNAIRE - PHQ9
SUM OF ALL RESPONSES TO PHQ9 QUESTIONS 1 & 2: 0
1. LITTLE INTEREST OR PLEASURE IN DOING THINGS: 0
2. FEELING DOWN, DEPRESSED OR HOPELESS: 0
SUM OF ALL RESPONSES TO PHQ QUESTIONS 1-9: 0
SUM OF ALL RESPONSES TO PHQ QUESTIONS 1-9: 0

## 2020-01-13 NOTE — PROGRESS NOTES
Gluc Sensor (ENLITE GLUCOSE SENSOR) MISC as directed q3 days dx: e11.9 10 each 1    Continuous Blood Gluc Sensor (DEXCOM G6 SENSOR) MISC Use as directed 9 each 5    amoxicillin-clavulanate (AUGMENTIN) 875-125 MG per tablet Take 1 tablet by mouth 2 times daily for 7 days 14 tablet 0    vilazodone HCl (VILAZODONE HCL) 10 MG TABS Start 1 PO QD, and in 1 week go to 20mg PO QD 28 tablet 0    Insulin Infusion Pump KIT by Does not apply route      meclizine (ANTIVERT) 25 MG tablet 1/2 - 1 tab PO every 6 hours prn (Patient not taking: Reported on 1/13/2020) 30 tablet 0     No current facility-administered medications for this visit.         No Known Allergies    Family History   Problem Relation Age of Onset    High Blood Pressure Father     Diabetes Father     Diabetes Sister     Liver Disease Maternal Grandmother         cirrhosis, nonalcoholic    Cancer Paternal Grandmother         lung, smoker       Social History     Socioeconomic History    Marital status:      Spouse name: Not on file    Number of children: Not on file    Years of education: Not on file    Highest education level: Not on file   Occupational History    Not on file   Social Needs    Financial resource strain: Not on file    Food insecurity:     Worry: Not on file     Inability: Not on file    Transportation needs:     Medical: Not on file     Non-medical: Not on file   Tobacco Use    Smoking status: Never Smoker    Smokeless tobacco: Current User     Types: Chew   Substance and Sexual Activity    Alcohol use: Yes     Comment: rare    Drug use: No    Sexual activity: Not on file   Lifestyle    Physical activity:     Days per week: Not on file     Minutes per session: Not on file    Stress: Not on file   Relationships    Social connections:     Talks on phone: Not on file     Gets together: Not on file     Attends Shinto service: Not on file     Active member of club or organization: Not on file     Attends meetings of clubs or organizations: Not on file     Relationship status: Not on file    Intimate partner violence:     Fear of current or ex partner: Not on file     Emotionally abused: Not on file     Physically abused: Not on file     Forced sexual activity: Not on file   Other Topics Concern    Not on file   Social History Narrative     [place] [employer], , 3 children. Review of Systems :  Review of Systems   Constitutional: Negative for fatigue, malaise, fever, chills, appetite change and unexpected weight change. HENT: as per HPI otherwise negative. Eyes: Negative for vision changes, double vision, pain  Respiratory: Negative for cough, chest tightness, shortness of breath, chest heaviness, pleuritic pain,  wheezing. Cardiovascular: Negative for diaphoresis, chest pain, palpitations, or edema   Gastrointestinal: Negative for nausea, vomiting, abdominal pain, diarrhea, constipation, blood in stool, melena, changes in bowel habits, abdominal distention, anal bleeding and rectal pain. Endocrine: Negative for polydipsia, polyphagia and polyuria. No heat or cold intolerance. Genitourinary: Negative for dysuria, urgency, frequency, hematuria, difficulty urinating, nocturia. Musculoskeletal: Negative for myalgias, back pain, joint swelling and arthralgias. No gait disturbance. Skin: Negative for rash, lesions, hair or nail changes. Allergic/Immunologic: Negative for environmental allergies and food allergies. Neurological: Negative for dizziness, weakness, tremors, syncope, speech difficulty, light-headedness, bowel or bladder control changes, numbness and headaches. Hematological: Negative for adenopathy. Does not bruise/bleed easily. Psychiatric/Behavioral: Negative for suicidal ideas, behavioral problems, sleep disturbance and decreased concentration.    Unless otherwise stated in this report or unable to obtain because of the patient's clinical or mental status as evidenced by the medical record, this patients's positive and negative responses for Review of Systems, constitutional, psych, eyes, ENT, cardiovascular, respiratory, gastrointestinal, neurological, genitourinary, musculoskeletal, integument systems and systems related to the presenting problem are either stated in the preceding or were not pertinent or were negative for the symptoms and/or complaints related to the medical problem. Physical Exam:   Vitals:    01/13/20 0937   BP: 110/82   Pulse: 84   Temp: 97.6 °F (36.4 °C)   SpO2: 98%   Weight: 228 lb (103.4 kg)   Height: 6' (1.829 m)     Constitutional:  A and O x 3, in NAD. Afebrile. Appears stated age. Head:  NCAT. Eyes:  PERRLA, EOMI without nystagmus. Conjunctiva normal. Sclera anicteric. Ears:  External ears without lesions. TM's clear bilaterally. Nose: turbinates erythematous, no lesions, +TTP over maxillary and frontal sinuses. Throat:  Pharynx without lesions, +post nasal drip. Airway patient. Mucous membranes pink and moist without lesions. Neck:  Supple. Nontender, no lymphadenopathy noted, no thyromegaly. Lungs:  Clear to auscultation and breath sounds equal.  Heart:  Regular rate and rhythm, normal S1 and S2, no m/r/g. No murmurs with change in position or Valsalva. PMI non-displaced. UE and LE distal pulses intact. Abdomen:  Soft, NTND, NABS x 4, no masses or organomegaly, no rebound or guarding. MS: Normal, painless range of motion. No neurovascular deficit. 5/5 strength in UE's/LE's/ bilaterally. Skin: large verruca on arm. No abrasions, ecchymoses, or lacerations unless noted elsewhere. Extremities  No cyanosis, clubbing, or edema noted. Neurological:   Oriented. Motor functions intact. Psych: anxious, pleasant, normal affect, appropriate thoughts and insight. Assessment/Plan:     Alejandrina Colin was seen today for anxiety, sinus problem and diabetes.     Diagnoses and all orders for this visit:    Mixed hyperlipidemia  - COMPREHENSIVE METABOLIC PANEL; Future  -     LIPID PANEL; Future    Diabetes mellitus type 1, controlled, insulin dependent (HCC)  -     insulin lispro (HUMALOG) 100 UNIT/ML injection vial; use as directed for insulin pump-2. 5units/hr plus 4 to 5 boluses thru the day  -     HEMOGLOBIN A1C; Future  -     Continuous Blood Gluc Sensor (DEXCOM G6 SENSOR) MISC; Use as directed    Gastroesophageal reflux disease, esophagitis presence not specified  -     esomeprazole (NEXIUM) 40 MG delayed release capsule; Take 1 capsule by mouth every morning (before breakfast)  -     Shawn Vitale MD, General Surgery, [place]([facility])    Chronic rhinitis  -     fluticasone (FLONASE) 50 MCG/ACT nasal spray; One spray into each nostril once daily  -     CBC; Future    Anxiety  -     Discontinue: buPROPion (WELLBUTRIN XL) 150 MG extended release tablet; Start 1 PO QD in AM, if having no side effects, go to 2 tabs po qd in 2 weeks  -     CBC; Future  -     vilazodone HCl (VILAZODONE HCL) 10 MG TABS; Start 1 PO QD, and in 1 week go to 20mg PO QD    Verruca  -     External Referral To Dermatology    Acute non-recurrent sinusitis, unspecified location  -     amoxicillin-clavulanate (AUGMENTIN) 875-125 MG per tablet; Take 1 tablet by mouth 2 times daily for 7 days    Other orders  -     blood glucose test strips (ASCENSIA AUTODISC VI;ONE TOUCH ULTRA TEST VI) strip; Test strips to use 8-10 times a day for control of type 1 brittle diabetes  -     Continuous Blood Gluc Sensor (ENLITE GLUCOSE SENSOR) [facility]; as directed q3 days dx: e11.9    treatment as above, will await his labs. Refer for upper EGD, ongoing GERD despite PPI. Cont flonase, add mucinex. Return in about 4 weeks (around 2/10/2020). Counseled regarding above diagnosis, including possible risks and complications,  especially if left uncontrolled.      Counseled regarding the possible side effects, risks, benefits and alternatives to treatment; patient and/or guardian verbalizes understanding, agrees, feels comfortable with and wishes to proceed with above treatment plan. Advised patient to call with any new medication issues, and read all Rx info from pharmacy to assure aware of all possible risks and side effects of medication before taking. Reviewed age and gender appropriate health screening exams and vaccinations. Advised patient regarding importance of keeping up with recommended health maintenance and to schedule as soon as possible if overdue, as this is important in assessing for undiagnosed pathology, especially cancer, as well as protecting against potentially harmful/life threatening disease. Patient and/or guardian verbalizes understanding and agrees with above counseling, assessment and plan. All questions answered.     Michell Mckeon PA-C

## 2020-03-09 ENCOUNTER — OFFICE VISIT (OUTPATIENT)
Dept: FAMILY MEDICINE CLINIC | Age: 40
End: 2020-03-09
Payer: COMMERCIAL

## 2020-03-09 VITALS — OXYGEN SATURATION: 98 % | BODY MASS INDEX: 29.84 KG/M2 | WEIGHT: 220 LBS | TEMPERATURE: 99.1 F | HEART RATE: 84 BPM

## 2020-03-09 PROCEDURE — 99213 OFFICE O/P EST LOW 20 MIN: CPT | Performed by: PHYSICIAN ASSISTANT

## 2020-03-09 RX ORDER — CEFDINIR 300 MG/1
300 CAPSULE ORAL 2 TIMES DAILY
Qty: 14 CAPSULE | Refills: 0 | Status: SHIPPED | OUTPATIENT
Start: 2020-03-09 | End: 2020-03-16

## 2020-03-09 RX ORDER — METHYLPREDNISOLONE 4 MG/1
TABLET ORAL
Qty: 1 KIT | Refills: 0 | Status: SHIPPED | OUTPATIENT
Start: 2020-03-09 | End: 2021-11-28

## 2020-03-09 ASSESSMENT — ENCOUNTER SYMPTOMS
EYES NEGATIVE: 1
SORE THROAT: 1
SINUS PRESSURE: 1
COUGH: 0
SINUS PAIN: 1
GASTROINTESTINAL NEGATIVE: 1
RESPIRATORY NEGATIVE: 1

## 2020-03-09 NOTE — PROGRESS NOTES
Chief Complaint   Patient presents with    Sinusitis     started 3/2    Pharyngitis    Drainage       HPI:  Patient presents today for sinus pressure for the last week and a half. Sore throat for the last couple days. No cough. Current Outpatient Medications:     cefdinir (OMNICEF) 300 MG capsule, Take 1 capsule by mouth 2 times daily for 7 days, Disp: 14 capsule, Rfl: 0    methylPREDNISolone (MEDROL DOSEPACK) 4 MG tablet, Take by mouth., Disp: 1 kit, Rfl: 0    calcium carbonate (OSCAL) 500 MG TABS tablet, Take 500 mg by mouth daily, Disp: , Rfl:     Omega-3 Fatty Acids (FISH OIL PO), Take 1 each by mouth daily, Disp: , Rfl:     Multiple Vitamins-Minerals (THERAPEUTIC MULTIVITAMIN-MINERALS) tablet, Take 1 tablet by mouth daily, Disp: , Rfl:     insulin lispro (HUMALOG) 100 UNIT/ML injection vial, use as directed for insulin pump-2. 5units/hr plus 4 to 5 boluses thru the day, Disp: 16 vial, Rfl: 2    esomeprazole (NEXIUM) 40 MG delayed release capsule, Take 1 capsule by mouth every morning (before breakfast), Disp: 90 capsule, Rfl: 1    fluticasone (FLONASE) 50 MCG/ACT nasal spray, One spray into each nostril once daily, Disp: 3 Bottle, Rfl: 1    blood glucose test strips (ASCENSIA AUTODISC VI;ONE TOUCH ULTRA TEST VI) strip, Test strips to use 8-10 times a day for control of type 1 brittle diabetes, Disp: 900 each, Rfl: 2    Continuous Blood Gluc Sensor (ENLITE GLUCOSE SENSOR) MISC, as directed q3 days dx: e11.9, Disp: 10 each, Rfl: 1    Continuous Blood Gluc Sensor (DEXCOM G6 SENSOR) MISC, Use as directed, Disp: 9 each, Rfl: 5    vilazodone HCl (VILAZODONE HCL) 10 MG TABS, Start 1 PO QD, and in 1 week go to 20mg PO QD, Disp: 28 tablet, Rfl: 0    meclizine (ANTIVERT) 25 MG tablet, 1/2 - 1 tab PO every 6 hours prn, Disp: 30 tablet, Rfl: 0    Insulin Infusion Pump KIT, by Does not apply route, Disp: , Rfl:        No Known Allergies      Review of Systems  Review of Systems   Constitutional: Negative. HENT: Positive for congestion, sinus pressure, sinus pain and sore throat. Negative for ear discharge and ear pain. Eyes: Negative. Respiratory: Negative. Negative for cough. Cardiovascular: Negative. Gastrointestinal: Negative. VS:  Pulse 84   Temp 99.1 °F (37.3 °C)   Wt 220 lb (99.8 kg)   SpO2 98%   BMI 29.84 kg/m²     Patient's medical, social, and family history reviewed      Physical Exam  Physical Exam  Vitals signs and nursing note reviewed. Constitutional:       General: He is not in acute distress. Appearance: Normal appearance. He is normal weight. He is not toxic-appearing. HENT:      Head: Normocephalic. Right Ear: Tympanic membrane, ear canal and external ear normal. There is no impacted cerumen. Left Ear: Tympanic membrane, ear canal and external ear normal. There is no impacted cerumen. Nose: Congestion and rhinorrhea present. Right Sinus: Maxillary sinus tenderness and frontal sinus tenderness present. Left Sinus: Maxillary sinus tenderness and frontal sinus tenderness present. Mouth/Throat:      Pharynx: Oropharyngeal exudate present. Eyes:      Extraocular Movements: Extraocular movements intact. Conjunctiva/sclera: Conjunctivae normal.      Pupils: Pupils are equal, round, and reactive to light. Neck:      Musculoskeletal: Normal range of motion and neck supple. No muscular tenderness. Cardiovascular:      Rate and Rhythm: Normal rate and regular rhythm. Pulses: Normal pulses. Heart sounds: Normal heart sounds. Pulmonary:      Effort: Pulmonary effort is normal. No respiratory distress. Breath sounds: Normal breath sounds. No stridor. No wheezing, rhonchi or rales. Chest:      Chest wall: No tenderness. Abdominal:      General: Abdomen is flat. There is no distension. Palpations: Abdomen is soft. There is no mass. Tenderness: There is no abdominal tenderness.  There is no right CVA tenderness, left CVA tenderness, guarding or rebound. Hernia: No hernia is present. Lymphadenopathy:      Cervical: No cervical adenopathy. Neurological:      Mental Status: He is alert. Assessment/Plan:  Saeid Sheets was seen today for sinusitis, pharyngitis and drainage. Diagnoses and all orders for this visit:    Acute bacterial sinusitis  -     cefdinir (OMNICEF) 300 MG capsule; Take 1 capsule by mouth 2 times daily for 7 days  -     methylPREDNISolone (MEDROL DOSEPACK) 4 MG tablet; Take by mouth. Pt. to follow up if PCP if no better 1 week.      Alba Zhang PA-C

## 2020-06-19 RX ORDER — BLOOD-GLUCOSE SENSOR
EACH MISCELLANEOUS
Qty: 9 EACH | Refills: 5 | Status: SHIPPED
Start: 2020-06-19 | End: 2020-07-14 | Stop reason: SDUPTHER

## 2020-07-14 RX ORDER — BLOOD-GLUCOSE SENSOR
EACH MISCELLANEOUS
Qty: 9 EACH | Refills: 5 | Status: SHIPPED | OUTPATIENT
Start: 2020-07-14 | End: 2020-07-14 | Stop reason: SDUPTHER

## 2020-07-14 RX ORDER — BLOOD-GLUCOSE SENSOR
EACH MISCELLANEOUS
Qty: 9 EACH | Refills: 5 | Status: SHIPPED
Start: 2020-07-14 | End: 2021-02-22 | Stop reason: SDUPTHER

## 2020-11-09 ENCOUNTER — TELEPHONE (OUTPATIENT)
Dept: ADMINISTRATIVE | Age: 40
End: 2020-11-09

## 2020-11-09 NOTE — TELEPHONE ENCOUNTER
I can place orders for labs, but then he would need to be seen after them either in person or virtually.

## 2020-11-09 NOTE — TELEPHONE ENCOUNTER
Pt's wife called and would like to know if pt can come in for blood work or does he need to be seen by you? I didn't see any orders put in.

## 2020-11-16 ENCOUNTER — NURSE ONLY (OUTPATIENT)
Dept: PRIMARY CARE CLINIC | Age: 40
End: 2020-11-16
Payer: COMMERCIAL

## 2020-11-16 DIAGNOSIS — E10.9 DIABETES MELLITUS TYPE 1, CONTROLLED, INSULIN DEPENDENT (HCC): ICD-10-CM

## 2020-11-16 LAB
ALBUMIN SERPL-MCNC: 4.7 G/DL (ref 3.5–5.2)
ALP BLD-CCNC: 99 U/L (ref 40–129)
ALT SERPL-CCNC: 25 U/L (ref 0–40)
ANION GAP SERPL CALCULATED.3IONS-SCNC: 14 MMOL/L (ref 7–16)
AST SERPL-CCNC: 20 U/L (ref 0–39)
BILIRUB SERPL-MCNC: 0.7 MG/DL (ref 0–1.2)
BUN BLDV-MCNC: 14 MG/DL (ref 6–20)
CALCIUM SERPL-MCNC: 9.9 MG/DL (ref 8.6–10.2)
CHLORIDE BLD-SCNC: 102 MMOL/L (ref 98–107)
CHOLESTEROL, TOTAL: 195 MG/DL (ref 0–199)
CO2: 27 MMOL/L (ref 22–29)
CREAT SERPL-MCNC: 0.9 MG/DL (ref 0.7–1.2)
GFR AFRICAN AMERICAN: >60
GFR NON-AFRICAN AMERICAN: >60 ML/MIN/1.73
GLUCOSE BLD-MCNC: 122 MG/DL (ref 74–99)
HBA1C MFR BLD: 7.6 % (ref 4–5.6)
HDLC SERPL-MCNC: 37 MG/DL
LDL CHOLESTEROL CALCULATED: 127 MG/DL (ref 0–99)
POTASSIUM SERPL-SCNC: 5.1 MMOL/L (ref 3.5–5)
SODIUM BLD-SCNC: 143 MMOL/L (ref 132–146)
TOTAL PROTEIN: 7.6 G/DL (ref 6.4–8.3)
TRIGL SERPL-MCNC: 154 MG/DL (ref 0–149)
VLDLC SERPL CALC-MCNC: 31 MG/DL

## 2020-11-16 PROCEDURE — 36415 COLL VENOUS BLD VENIPUNCTURE: CPT | Performed by: PHYSICIAN ASSISTANT

## 2020-11-16 PROCEDURE — 83036 HEMOGLOBIN GLYCOSYLATED A1C: CPT | Performed by: PHYSICIAN ASSISTANT

## 2021-02-05 NOTE — TELEPHONE ENCOUNTER
Pt will have wife call us back to schedule lab appt. Signed order for lab faxed to [facility] and to [facility].

## 2021-02-22 DIAGNOSIS — E10.9 DIABETES MELLITUS TYPE 1, CONTROLLED, INSULIN DEPENDENT (HCC): ICD-10-CM

## 2021-02-22 DIAGNOSIS — J31.0 CHRONIC RHINITIS: ICD-10-CM

## 2021-02-22 RX ORDER — FLUTICASONE PROPIONATE 50 MCG
SPRAY, SUSPENSION (ML) NASAL
Qty: 3 BOTTLE | Refills: 1 | Status: SHIPPED
Start: 2021-02-22 | End: 2022-02-24

## 2021-02-22 RX ORDER — BLOOD-GLUCOSE SENSOR
EACH MISCELLANEOUS
Qty: 10 EACH | Refills: 1 | Status: SHIPPED
Start: 2021-02-22 | End: 2022-01-19

## 2021-02-22 RX ORDER — FLUTICASONE PROPIONATE 50 MCG
SPRAY, SUSPENSION (ML) NASAL
Qty: 48 G | Refills: 1 | Status: SHIPPED
Start: 2021-02-22 | End: 2022-02-24 | Stop reason: SDUPTHER

## 2021-02-23 RX ORDER — BLOOD-GLUCOSE SENSOR
EACH MISCELLANEOUS
Qty: 9 EACH | Refills: 5 | Status: SHIPPED | OUTPATIENT
Start: 2021-02-23

## 2021-10-14 ENCOUNTER — TELEPHONE (OUTPATIENT)
Dept: PRIMARY CARE CLINIC | Age: 41
End: 2021-10-14

## 2021-10-14 DIAGNOSIS — E55.9 VITAMIN D DEFICIENCY: ICD-10-CM

## 2021-10-14 DIAGNOSIS — E10.9 DIABETES MELLITUS TYPE 1, CONTROLLED, INSULIN DEPENDENT (HCC): ICD-10-CM

## 2021-10-14 DIAGNOSIS — E10.9 DIABETES MELLITUS TYPE 1, CONTROLLED, INSULIN DEPENDENT (HCC): Primary | ICD-10-CM

## 2021-10-14 DIAGNOSIS — E78.2 MIXED HYPERLIPIDEMIA: ICD-10-CM

## 2021-10-14 NOTE — TELEPHONE ENCOUNTER
----- Message from [facility] sent at 10/14/2021  8:51 AM EDT -----  Subject: Refill Request    QUESTIONS  Name of Medication? insulin lispro (HUMALOG) 100 UNIT/ML injection vial  Patient-reported dosage and instructions? USE 2.5 UNITS PER HOUR PLUS 4 TO   5 BOLUSES THROUGH THE DAY VIA INSULIN PUMP AS DIRECTED  How many days do you have left? 1  Preferred Pharmacy? [address]  phone number (if available)? [phone]  ---------------------------------------------------------------------------  --------------  CALL BACK INFO  What is the best way for the office to contact you? OK to leave message on   voicemail  Preferred Call Back Phone Number?  [phone]

## 2021-10-14 NOTE — TELEPHONE ENCOUNTER
----- Message from Fredo Andujar sent at 10/14/2021  8:51 AM EDT -----  Subject: Referral Request    QUESTIONS   Reason for referral request? Patient requesting blood work   Has the physician seen you for this condition before? No   Preferred Specialist (if applicable)? Do you already have an appointment scheduled? Additional Information for Provider?   ---------------------------------------------------------------------------  --------------  CALL BACK INFO  What is the best way for the office to contact you? OK to leave message on   voicemail  Preferred Call Back Phone Number?  [phone]

## 2021-11-19 DIAGNOSIS — E10.9 DIABETES MELLITUS TYPE 1, CONTROLLED, INSULIN DEPENDENT (HCC): ICD-10-CM

## 2021-11-19 DIAGNOSIS — E78.2 MIXED HYPERLIPIDEMIA: ICD-10-CM

## 2021-11-19 DIAGNOSIS — E55.9 VITAMIN D DEFICIENCY: ICD-10-CM

## 2021-11-19 LAB
ALBUMIN SERPL-MCNC: 4.9 G/DL (ref 3.5–5.2)
ALP BLD-CCNC: 118 U/L (ref 40–129)
ALT SERPL-CCNC: 40 U/L (ref 0–40)
ANION GAP SERPL CALCULATED.3IONS-SCNC: 13 MMOL/L (ref 7–16)
AST SERPL-CCNC: 27 U/L (ref 0–39)
BILIRUB SERPL-MCNC: 0.5 MG/DL (ref 0–1.2)
BUN BLDV-MCNC: 12 MG/DL (ref 6–20)
CALCIUM SERPL-MCNC: 9.7 MG/DL (ref 8.6–10.2)
CHLORIDE BLD-SCNC: 101 MMOL/L (ref 98–107)
CHOLESTEROL, TOTAL: 183 MG/DL (ref 0–199)
CO2: 25 MMOL/L (ref 22–29)
CREAT SERPL-MCNC: 0.8 MG/DL (ref 0.7–1.2)
CREATININE URINE: 157 MG/DL (ref 40–278)
GFR AFRICAN AMERICAN: >60
GFR NON-AFRICAN AMERICAN: >60 ML/MIN/1.73
GLUCOSE BLD-MCNC: 160 MG/DL (ref 74–99)
HBA1C MFR BLD: 7.6 % (ref 4–5.6)
HCT VFR BLD CALC: 48.7 % (ref 37–54)
HDLC SERPL-MCNC: 31 MG/DL
HEMOGLOBIN: 16.2 G/DL (ref 12.5–16.5)
LDL CHOLESTEROL CALCULATED: 107 MG/DL (ref 0–99)
MCH RBC QN AUTO: 31.1 PG (ref 26–35)
MCHC RBC AUTO-ENTMCNC: 33.3 % (ref 32–34.5)
MCV RBC AUTO: 93.5 FL (ref 80–99.9)
MICROALBUMIN UR-MCNC: <12 MG/L
MICROALBUMIN/CREAT UR-RTO: ABNORMAL (ref 0–30)
PDW BLD-RTO: 13.1 FL (ref 11.5–15)
PLATELET # BLD: 305 E9/L (ref 130–450)
PMV BLD AUTO: 9.3 FL (ref 7–12)
POTASSIUM SERPL-SCNC: 3.9 MMOL/L (ref 3.5–5)
RBC # BLD: 5.21 E12/L (ref 3.8–5.8)
SODIUM BLD-SCNC: 139 MMOL/L (ref 132–146)
TOTAL PROTEIN: 7.4 G/DL (ref 6.4–8.3)
TRIGL SERPL-MCNC: 223 MG/DL (ref 0–149)
VITAMIN D 25-HYDROXY: 49 NG/ML (ref 30–100)
VLDLC SERPL CALC-MCNC: 45 MG/DL
WBC # BLD: 6.7 E9/L (ref 4.5–11.5)

## 2021-12-06 ENCOUNTER — TELEPHONE (OUTPATIENT)
Dept: PRIMARY CARE CLINIC | Age: 41
End: 2021-12-06

## 2021-12-14 ENCOUNTER — TELEPHONE (OUTPATIENT)
Dept: PRIMARY CARE CLINIC | Age: 41
End: 2021-12-14

## 2021-12-14 NOTE — TELEPHONE ENCOUNTER
----- Message from Opal Maldonado sent at 12/14/2021  9:57 AM EST -----  Subject: Message to Provider    QUESTIONS  Information for Provider? Pt sees Dr. Eugenio Olivares & would like a call back   from practice manager to discuss frustration with communication w/ the   practice. ---------------------------------------------------------------------------  --------------  Kaia FENTON  What is the best way for the office to contact you? OK to leave message on   voicemail  Preferred Call Back Phone Number? [phone]  ---------------------------------------------------------------------------  --------------  SCRIPT ANSWERS  Relationship to Patient?  Self

## 2021-12-17 LAB — DIABETIC RETINOPATHY: POSITIVE

## 2022-01-10 ENCOUNTER — TELEPHONE (OUTPATIENT)
Dept: PRIMARY CARE CLINIC | Age: 42
End: 2022-01-10

## 2022-01-10 NOTE — TELEPHONE ENCOUNTER
Called pt to make appt as he has not been seen in 2 years to get his insulin refilled.  Lm for pt to call back

## 2022-01-19 ENCOUNTER — OFFICE VISIT (OUTPATIENT)
Dept: FAMILY MEDICINE CLINIC | Age: 42
End: 2022-01-19
Payer: COMMERCIAL

## 2022-01-19 VITALS
WEIGHT: 235 LBS | TEMPERATURE: 97.5 F | OXYGEN SATURATION: 98 % | DIASTOLIC BLOOD PRESSURE: 84 MMHG | HEART RATE: 100 BPM | HEIGHT: 72 IN | BODY MASS INDEX: 31.83 KG/M2 | SYSTOLIC BLOOD PRESSURE: 134 MMHG

## 2022-01-19 DIAGNOSIS — J02.9 SORE THROAT: ICD-10-CM

## 2022-01-19 DIAGNOSIS — M25.511 CHRONIC RIGHT SHOULDER PAIN: Primary | ICD-10-CM

## 2022-01-19 DIAGNOSIS — G89.29 CHRONIC RIGHT SHOULDER PAIN: Primary | ICD-10-CM

## 2022-01-19 PROCEDURE — 99214 OFFICE O/P EST MOD 30 MIN: CPT | Performed by: FAMILY MEDICINE

## 2022-01-19 RX ORDER — CEFDINIR 300 MG/1
300 CAPSULE ORAL 2 TIMES DAILY
Qty: 20 CAPSULE | Refills: 0 | Status: SHIPPED | OUTPATIENT
Start: 2022-01-19 | End: 2022-01-29

## 2022-01-19 NOTE — PROGRESS NOTES
[employer]    Moped. presents to the office today for   Chief Complaint   Patient presents with    Pharyngitis    Cough    Shoulder Pain     Sore throat  X 3 days  Augmentin about 2 weeks ago, sore throat relapsed after he finished it  No fever  No loss of taste or smell  No n/v/d    R shoulder pain  X 1 month  Hx of bursitis in left shoulder  This feels similar  No known injury  Works as a   Lifting arm makes pain worse  Advil with relief      Review of Systems     /84   Pulse 100   Temp 97.5 °F (36.4 °C) (Temporal)   Ht 6' (1.829 m)   Wt 235 lb (106.6 kg)   SpO2 98%   BMI 31.87 kg/m²   Physical Exam  Constitutional:       Appearance: Normal appearance. HENT:      Head: Normocephalic and atraumatic. Mouth/Throat:      Pharynx: Oropharyngeal exudate and posterior oropharyngeal erythema present. Eyes:      Extraocular Movements: Extraocular movements intact. Conjunctiva/sclera: Conjunctivae normal.   Cardiovascular:      Rate and Rhythm: Normal rate. Heart sounds: Normal heart sounds. Pulmonary:      Effort: Pulmonary effort is normal.      Breath sounds: Normal breath sounds. Skin:     General: Skin is warm. Neurological:      Mental Status: He is alert and oriented to person, place, and time.    Psychiatric:         Mood and Affect: Mood normal.         Behavior: Behavior normal.            Current Outpatient Medications:     cefdinir (OMNICEF) 300 MG capsule, Take 1 capsule by mouth 2 times daily for 10 days, Disp: 20 capsule, Rfl: 0    insulin lispro (HUMALOG) 100 UNIT/ML injection vial, USE 2.5 UNITS PER HOUR PLUS 4 TO 5 BOLUSES THROUGH THE DAY VIA INSULIN PUMP AS DIRECTED, Disp: 480 mL, Rfl: 0    blood glucose test strips (ASCENSIA AUTODISC VI;ONE TOUCH ULTRA TEST VI) strip, Test strips to use 8-10 times a day for control of type 1 brittle diabetes, Disp: 900 each, Rfl: 0    Continuous Blood Gluc Sensor (DEXCOM G6 SENSOR) MISC, Use as directed, Disp: 9 each, Rfl: 5    fluticasone (FLONASE) 50 MCG/ACT nasal spray, instill 1 spray into each nostril once daily, Disp: 48 g, Rfl: 1    fluticasone (FLONASE) 50 MCG/ACT nasal spray, One spray into each nostril once daily, Disp: 3 Bottle, Rfl: 1    calcium carbonate (OSCAL) 500 MG TABS tablet, Take 500 mg by mouth daily, Disp: , Rfl:     Omega-3 Fatty Acids (FISH OIL PO), Take 1 each by mouth daily, Disp: , Rfl:     Multiple Vitamins-Minerals (THERAPEUTIC MULTIVITAMIN-MINERALS) tablet, Take 1 tablet by mouth daily, Disp: , Rfl:     esomeprazole (NEXIUM) 40 MG delayed release capsule, Take 1 capsule by mouth every morning (before breakfast), Disp: 90 capsule, Rfl: 1    vilazodone HCl (VILAZODONE HCL) 10 MG TABS, Start 1 PO QD, and in 1 week go to 20mg PO QD, Disp: 28 tablet, Rfl: 0    meclizine (ANTIVERT) 25 MG tablet, 1/2 - 1 tab PO every 6 hours prn, Disp: 30 tablet, Rfl: 0    Insulin Infusion Pump KIT, by Does not apply route, Disp: , Rfl:      Past Medical History:   Diagnosis Date    [facility] [address]     Diabetic retinopathy ([facility] [address]     GERD (gastroesophageal reflux disease)        Sivakumar Haile was seen today for pharyngitis, cough and shoulder pain. Diagnoses and all orders for this visit:    Chronic right shoulder pain  -     XR SHOULDER RIGHT (MIN 2 VIEWS); Future    Sore throat  -     cefdinir (OMNICEF) 300 MG capsule;  Take 1 capsule by mouth 2 times daily for 10 days       Defer steroids as he has Type 1 diabetes  Shoulder xray  Declines COVID testing  Treat as presumed strep    Dawn Mckeon MD

## 2022-01-21 LAB — DIABETIC RETINOPATHY: POSITIVE

## 2022-02-24 ENCOUNTER — OFFICE VISIT (OUTPATIENT)
Dept: FAMILY MEDICINE CLINIC | Age: 42
End: 2022-02-24
Payer: COMMERCIAL

## 2022-02-24 VITALS
SYSTOLIC BLOOD PRESSURE: 124 MMHG | TEMPERATURE: 96.9 F | OXYGEN SATURATION: 96 % | HEART RATE: 93 BPM | HEIGHT: 72 IN | BODY MASS INDEX: 31.56 KG/M2 | WEIGHT: 233 LBS | DIASTOLIC BLOOD PRESSURE: 74 MMHG

## 2022-02-24 DIAGNOSIS — E78.1 HYPERTRIGLYCERIDEMIA: ICD-10-CM

## 2022-02-24 DIAGNOSIS — K21.9 GASTROESOPHAGEAL REFLUX DISEASE, UNSPECIFIED WHETHER ESOPHAGITIS PRESENT: ICD-10-CM

## 2022-02-24 DIAGNOSIS — E55.9 VITAMIN D DEFICIENCY: ICD-10-CM

## 2022-02-24 DIAGNOSIS — E10.9 DIABETES MELLITUS TYPE 1, CONTROLLED, INSULIN DEPENDENT (HCC): Primary | ICD-10-CM

## 2022-02-24 DIAGNOSIS — E78.2 MIXED HYPERLIPIDEMIA: ICD-10-CM

## 2022-02-24 DIAGNOSIS — J31.0 CHRONIC RHINITIS: ICD-10-CM

## 2022-02-24 PROCEDURE — 99214 OFFICE O/P EST MOD 30 MIN: CPT | Performed by: FAMILY MEDICINE

## 2022-02-24 RX ORDER — FLUTICASONE PROPIONATE 50 MCG
SPRAY, SUSPENSION (ML) NASAL
Qty: 48 G | Refills: 1 | Status: SHIPPED
Start: 2022-02-24 | End: 2022-08-01 | Stop reason: SDUPTHER

## 2022-02-24 ASSESSMENT — PATIENT HEALTH QUESTIONNAIRE - PHQ9
SUM OF ALL RESPONSES TO PHQ QUESTIONS 1-9: 0
1. LITTLE INTEREST OR PLEASURE IN DOING THINGS: 0
SUM OF ALL RESPONSES TO PHQ QUESTIONS 1-9: 0
SUM OF ALL RESPONSES TO PHQ QUESTIONS 1-9: 0
2. FEELING DOWN, DEPRESSED OR HOPELESS: 0
SUM OF ALL RESPONSES TO PHQ9 QUESTIONS 1 & 2: 0
SUM OF ALL RESPONSES TO PHQ QUESTIONS 1-9: 0

## 2022-02-24 NOTE — PROGRESS NOTES
Zach Calvo presents to the office today for   Chief Complaint   Patient presents with    Established New Doctor       Type 1 Diabetes  Dx age 4  Using insulin pump  Typically well controlled  Eye exam up to date  He does have diabetic retinopathy - Dr. Han Mccullough     GERD   Taking nexium as Rx      3 children, 25, 23 and 12    Works at [employer] to dentist regularly    Review of Systems   Constitutional: Negative for chills and fever. Genitourinary: Negative for dysuria, hematuria and urgency. Skin: Negative for rash. Neurological: Negative for dizziness and light-headedness. /74   Pulse 93   Temp 96.9 °F (36.1 °C) (Temporal)   Ht 6' (1.829 m)   Wt 233 lb (105.7 kg)   SpO2 96%   BMI 31.60 kg/m²   Physical Exam  Constitutional:       Appearance: Normal appearance. HENT:      Head: Normocephalic and atraumatic. Eyes:      Extraocular Movements: Extraocular movements intact. Conjunctiva/sclera: Conjunctivae normal.   Cardiovascular:      Rate and Rhythm: Normal rate. Heart sounds: Normal heart sounds. Pulmonary:      Effort: Pulmonary effort is normal.      Breath sounds: Normal breath sounds. Skin:     General: Skin is warm. Neurological:      Mental Status: He is alert and oriented to person, place, and time.    Psychiatric:         Mood and Affect: Mood normal.         Behavior: Behavior normal.            Current Outpatient Medications:     insulin lispro (HUMALOG) 100 UNIT/ML injection vial, USE 2.5 UNITS PER HOUR PLUS 4 TO 5 BOLUSES THROUGH THE DAY VIA INSULIN PUMP AS DIRECTED, Disp: 08793 mL, Rfl: 1    fluticasone (FLONASE) 50 MCG/ACT nasal spray, instill 1 spray into each nostril once daily, Disp: 48 g, Rfl: 1    blood glucose test strips (ASCENSIA AUTODISC VI;ONE TOUCH ULTRA TEST VI) strip, Test strips to use 8-10 times a day for control of type 1 brittle diabetes, Disp: 900 each, Rfl: 0    Continuous Blood Gluc Sensor (DEXCOM G6 SENSOR) MISC, Use as directed, Disp: 9 each, Rfl: 5    Omega-3 Fatty Acids (FISH OIL PO), Take 1 each by mouth daily, Disp: , Rfl:     Multiple Vitamins-Minerals (THERAPEUTIC MULTIVITAMIN-MINERALS) tablet, Take 1 tablet by mouth daily, Disp: , Rfl:     esomeprazole (NEXIUM) 40 MG delayed release capsule, Take 1 capsule by mouth every morning (before breakfast), Disp: 90 capsule, Rfl: 1    Insulin Infusion Pump KIT, by Does not apply route, Disp: , Rfl:      Past Medical History:   Diagnosis Date    Diabetes mellitus ([facility] [address]     Diabetic retinopathy ([facility] [address]     GERD (gastroesophageal reflux disease)        Tonnynina Car was seen today for established new doctor.     Diagnoses and all orders for this visit:    Diabetes mellitus type 1, controlled, insulin dependent (HCC)  -     insulin lispro (HUMALOG) 100 UNIT/ML injection vial; USE 2.5 UNITS PER HOUR PLUS 4 TO 5 BOLUSES THROUGH THE DAY VIA INSULIN PUMP AS DIRECTED    Chronic rhinitis  -     fluticasone (FLONASE) 50 MCG/ACT nasal spray; instill 1 spray into each nostril once daily    Gastroesophageal reflux disease, unspecified whether esophagitis present    Hypertriglyceridemia    Mixed hyperlipidemia    Vitamin D deficiency       Stable  He manages his insulin well and is typically controlled  Recheck in fall for labs  He will alert us if anything needed    Ghada Cavazos MD

## 2022-03-02 ENCOUNTER — PATIENT MESSAGE (OUTPATIENT)
Dept: FAMILY MEDICINE CLINIC | Age: 42
End: 2022-03-02

## 2022-03-02 DIAGNOSIS — E10.9 DIABETES MELLITUS TYPE 1, CONTROLLED, INSULIN DEPENDENT (HCC): ICD-10-CM

## 2022-03-03 NOTE — TELEPHONE ENCOUNTER
Rx that was sent on 2/24 ends up being the equivalent of 53 vials, it is flagging that medication is nonformulary when attempting to pend. Any suggestions for what the patient should do?

## 2022-03-03 NOTE — TELEPHONE ENCOUNTER
From: Rebecca Haley. To: Dr. Lakhwinder Espinosa: 3/2/2022 4:15 PM EST  Subject: Insulin    Hey when I was there I had to get all my prescriptions renewed and I [facility] and I only got 8 bottles of insulin for 3 month supply I need 16 bottles thats what I always got can you please resend it to them because they wont change it even tho I always have gotten it from them thanks.

## 2022-03-10 ENCOUNTER — TELEPHONE (OUTPATIENT)
Dept: FAMILY MEDICINE CLINIC | Age: 42
End: 2022-03-10

## 2022-03-10 NOTE — TELEPHONE ENCOUNTER
Patient states that he uses about 215 units daily. [facility] updated. 215 units daily via pump 90 day supply 1 refill.

## 2022-03-10 NOTE — TELEPHONE ENCOUNTER
Medication sig clarification    [facility] called in the office today regarding patient's insulin sig. Pharmacy was wondering what is the proximate daily  Dosage and unit for patient?        In # [id number]

## 2022-08-01 ENCOUNTER — OFFICE VISIT (OUTPATIENT)
Dept: FAMILY MEDICINE CLINIC | Age: 42
End: 2022-08-01
Payer: COMMERCIAL

## 2022-08-01 VITALS
BODY MASS INDEX: 30.88 KG/M2 | HEART RATE: 84 BPM | WEIGHT: 228 LBS | RESPIRATION RATE: 15 BRPM | TEMPERATURE: 97.5 F | OXYGEN SATURATION: 95 % | SYSTOLIC BLOOD PRESSURE: 122 MMHG | HEIGHT: 72 IN | DIASTOLIC BLOOD PRESSURE: 84 MMHG

## 2022-08-01 DIAGNOSIS — E78.1 HYPERTRIGLYCERIDEMIA: ICD-10-CM

## 2022-08-01 DIAGNOSIS — Z83.49 FAMILY HISTORY OF ALPHA 1 ANTITRYPSIN DEFICIENCY: ICD-10-CM

## 2022-08-01 DIAGNOSIS — E55.9 VITAMIN D DEFICIENCY: ICD-10-CM

## 2022-08-01 DIAGNOSIS — J31.0 CHRONIC RHINITIS: ICD-10-CM

## 2022-08-01 DIAGNOSIS — Z00.00 PREVENTATIVE HEALTH CARE: Primary | ICD-10-CM

## 2022-08-01 DIAGNOSIS — E78.2 MIXED HYPERLIPIDEMIA: ICD-10-CM

## 2022-08-01 DIAGNOSIS — E10.9 DIABETES MELLITUS TYPE 1, CONTROLLED, INSULIN DEPENDENT (HCC): ICD-10-CM

## 2022-08-01 LAB
ALBUMIN SERPL-MCNC: 4.8 G/DL (ref 3.5–5.2)
ALP BLD-CCNC: 104 U/L (ref 40–129)
ALT SERPL-CCNC: 24 U/L (ref 0–40)
ANION GAP SERPL CALCULATED.3IONS-SCNC: 14 MMOL/L (ref 7–16)
AST SERPL-CCNC: 20 U/L (ref 0–39)
BASOPHILS ABSOLUTE: 0.03 E9/L (ref 0–0.2)
BASOPHILS RELATIVE PERCENT: 0.5 % (ref 0–2)
BILIRUB SERPL-MCNC: 0.4 MG/DL (ref 0–1.2)
BUN BLDV-MCNC: 13 MG/DL (ref 6–20)
CALCIUM SERPL-MCNC: 9.8 MG/DL (ref 8.6–10.2)
CHLORIDE BLD-SCNC: 106 MMOL/L (ref 98–107)
CHOLESTEROL, TOTAL: 189 MG/DL (ref 0–199)
CO2: 26 MMOL/L (ref 22–29)
CREAT SERPL-MCNC: 0.9 MG/DL (ref 0.7–1.2)
EOSINOPHILS ABSOLUTE: 0.07 E9/L (ref 0.05–0.5)
EOSINOPHILS RELATIVE PERCENT: 1.2 % (ref 0–6)
GFR AFRICAN AMERICAN: >60
GFR NON-AFRICAN AMERICAN: >60 ML/MIN/1.73
GLUCOSE BLD-MCNC: 147 MG/DL (ref 74–99)
HBA1C MFR BLD: 7.4 % (ref 4–5.6)
HCT VFR BLD CALC: 48.1 % (ref 37–54)
HDLC SERPL-MCNC: 36 MG/DL
HEMOGLOBIN: 16 G/DL (ref 12.5–16.5)
IMMATURE GRANULOCYTES #: 0.02 E9/L
IMMATURE GRANULOCYTES %: 0.3 % (ref 0–5)
LDL CHOLESTEROL CALCULATED: 118 MG/DL (ref 0–99)
LYMPHOCYTES ABSOLUTE: 1.95 E9/L (ref 1.5–4)
LYMPHOCYTES RELATIVE PERCENT: 32.4 % (ref 20–42)
MCH RBC QN AUTO: 31.1 PG (ref 26–35)
MCHC RBC AUTO-ENTMCNC: 33.3 % (ref 32–34.5)
MCV RBC AUTO: 93.6 FL (ref 80–99.9)
MONOCYTES ABSOLUTE: 0.56 E9/L (ref 0.1–0.95)
MONOCYTES RELATIVE PERCENT: 9.3 % (ref 2–12)
NEUTROPHILS ABSOLUTE: 3.39 E9/L (ref 1.8–7.3)
NEUTROPHILS RELATIVE PERCENT: 56.3 % (ref 43–80)
PDW BLD-RTO: 13.3 FL (ref 11.5–15)
PLATELET # BLD: 313 E9/L (ref 130–450)
PMV BLD AUTO: 9.5 FL (ref 7–12)
POTASSIUM SERPL-SCNC: 4.3 MMOL/L (ref 3.5–5)
RBC # BLD: 5.14 E12/L (ref 3.8–5.8)
SODIUM BLD-SCNC: 146 MMOL/L (ref 132–146)
TOTAL PROTEIN: 7.7 G/DL (ref 6.4–8.3)
TRIGL SERPL-MCNC: 177 MG/DL (ref 0–149)
TSH SERPL DL<=0.05 MIU/L-ACNC: 1.95 UIU/ML (ref 0.27–4.2)
VITAMIN B-12: 453 PG/ML (ref 211–946)
VITAMIN D 25-HYDROXY: 39 NG/ML (ref 30–100)
VLDLC SERPL CALC-MCNC: 35 MG/DL
WBC # BLD: 6 E9/L (ref 4.5–11.5)

## 2022-08-01 PROCEDURE — 99396 PREV VISIT EST AGE 40-64: CPT | Performed by: FAMILY MEDICINE

## 2022-08-01 RX ORDER — FLUTICASONE PROPIONATE 50 MCG
SPRAY, SUSPENSION (ML) NASAL
Qty: 48 G | Refills: 1 | Status: SHIPPED | OUTPATIENT
Start: 2022-08-01

## 2022-08-01 ASSESSMENT — PATIENT HEALTH QUESTIONNAIRE - PHQ9
1. LITTLE INTEREST OR PLEASURE IN DOING THINGS: 0
SUM OF ALL RESPONSES TO PHQ QUESTIONS 1-9: 0
2. FEELING DOWN, DEPRESSED OR HOPELESS: 0
SUM OF ALL RESPONSES TO PHQ QUESTIONS 1-9: 0
SUM OF ALL RESPONSES TO PHQ QUESTIONS 1-9: 0
SUM OF ALL RESPONSES TO PHQ9 QUESTIONS 1 & 2: 0
SUM OF ALL RESPONSES TO PHQ QUESTIONS 1-9: 0

## 2022-08-01 NOTE — PROGRESS NOTES
Sterlingmaryann Calvo presents to the office today for   Chief Complaint   Patient presents with    Other     Sister passed away in may from diann 1 was told family should be checked for it. Here for his physical    Sister passed from alpha 1 antitrypsin  He seeks to be tested    Type 1 Diabetes  Dx age 4  Using insulin pump  Typically well controlled  Eye exam up to date  He does have diabetic retinopathy - Dr. Charlotte Cyr     GERD  Taking nexium as Rx       3 children, 25, 23 and 12     Works at [employer] to dentist regularly    Review of Systems   Constitutional:  Negative for chills and fever. Genitourinary:  Negative for dysuria, hematuria and urgency. Skin:  Negative for rash. Neurological:  Negative for dizziness and light-headedness. /84   Pulse 84   Temp 97.5 °F (36.4 °C) (Temporal)   Resp 15   Ht 6' (1.829 m)   Wt 228 lb (103.4 kg)   SpO2 95%   BMI 30.92 kg/m²   Physical Exam  Constitutional:       Appearance: Normal appearance. HENT:      Head: Normocephalic and atraumatic. Eyes:      Extraocular Movements: Extraocular movements intact. Conjunctiva/sclera: Conjunctivae normal.   Cardiovascular:      Rate and Rhythm: Normal rate. Heart sounds: Normal heart sounds. Pulmonary:      Effort: Pulmonary effort is normal.      Breath sounds: Normal breath sounds. Skin:     General: Skin is warm. Neurological:      Mental Status: He is alert and oriented to person, place, and time.    Psychiatric:         Mood and Affect: Mood normal.         Behavior: Behavior normal.          Current Outpatient Medications:     fluticasone (FLONASE) 50 MCG/ACT nasal spray, instill 1 spray into each nostril once daily, Disp: 48 g, Rfl: 1    insulin lispro (HUMALOG) 100 UNIT/ML injection vial, USE 2.5 UNITS PER HOUR PLUS 4 TO 5 BOLUSES THROUGH THE DAY VIA INSULIN PUMP AS DIRECTED, Disp: 06594 mL, Rfl: 1    blood glucose test strips (ASCENSIA AUTODISC VI;ONE TOUCH ULTRA TEST VI) strip, Test strips to use 8-10 times a day for control of type 1 brittle diabetes, Disp: 900 each, Rfl: 0    Continuous Blood Gluc Sensor (DEXCOM G6 SENSOR) MISC, Use as directed, Disp: 9 each, Rfl: 5    Omega-3 Fatty Acids (FISH OIL PO), Take 1 each by mouth daily, Disp: , Rfl:     Multiple Vitamins-Minerals (THERAPEUTIC MULTIVITAMIN-MINERALS) tablet, Take 1 tablet by mouth daily, Disp: , Rfl:     esomeprazole (NEXIUM) 40 MG delayed release capsule, Take 1 capsule by mouth every morning (before breakfast), Disp: 90 capsule, Rfl: 1    Insulin Infusion Pump KIT, by Does not apply route, Disp: , Rfl:      Past Medical History:   Diagnosis Date    Diabetes mellitus ([facility] [address]     Diabetic retinopathy ([facility] [address]     GERD (gastroesophageal reflux disease)        Donald Warren was seen today for other. Diagnoses and all orders for this visit:    Preventative health care    Chronic rhinitis  -     fluticasone (FLONASE) 50 MCG/ACT nasal spray; instill 1 spray into each nostril once daily    Diabetes mellitus type 1, controlled, insulin dependent (HCC)  -     insulin lispro (HUMALOG) 100 UNIT/ML injection vial; USE 2.5 UNITS PER HOUR PLUS 4 TO 5 BOLUSES THROUGH THE DAY VIA INSULIN PUMP AS DIRECTED  -     CBC with Auto Differential; Future  -     Comprehensive Metabolic Panel; Future  -     Hemoglobin A1C; Future  -     Lipid Panel; Future  -     TSH; Future  -     Vitamin B12; Future  -     Vitamin D 25 Hydroxy; Future    Vitamin D deficiency  -     Vitamin D 25 Hydroxy; Future    Mixed hyperlipidemia  -     Comprehensive Metabolic Panel; Future  -     Lipid Panel; Future    Hypertriglyceridemia  -     Lipid Panel; Future    Family history of alpha 1 antitrypsin deficiency  -     Alpha-1-Antitrypsin;  Future       Labs today  Recheck 6 months if stable    Robin Huynh MD

## 2022-08-03 ENCOUNTER — PATIENT MESSAGE (OUTPATIENT)
Dept: FAMILY MEDICINE CLINIC | Age: 42
End: 2022-08-03

## 2022-08-03 DIAGNOSIS — E88.09 ALPHA-1-ANTICHYMOTRYPSIN DEFICIENCY: Primary | ICD-10-CM

## 2022-08-03 LAB — ALPHA-1 ANTITRYPSIN: 61 MG/DL (ref 90–200)

## 2022-08-04 NOTE — TELEPHONE ENCOUNTER
From: Yovanny Pearl. To: Dr. Margie Sal: 8/3/2022 4:39 PM EDT  Subject: Blood test    I was Wondering if the alpha-1 test was done I didnt see it on my results.  Thank you

## 2022-08-23 NOTE — TELEPHONE ENCOUNTER
I called the [facility] referral line, they received the referral and forwarded it to the genetics office on 8/17/2022. She stated that she would reach out the office for an update. I requested they contact the patient first. I also left my direct extension so Dr Roberto Chiu could be updated on the status of the referral as well.

## 2022-09-21 ENCOUNTER — PATIENT MESSAGE (OUTPATIENT)
Dept: FAMILY MEDICINE CLINIC | Age: 42
End: 2022-09-21

## 2022-11-15 ENCOUNTER — PATIENT MESSAGE (OUTPATIENT)
Dept: FAMILY MEDICINE CLINIC | Age: 42
End: 2022-11-15

## 2022-11-16 NOTE — TELEPHONE ENCOUNTER
From: Luz Maria Staff. To: Dr. Oneil Daughters: 11/15/2022 4:38 PM EST  Subject: Insulin pump     Hi I have called alok several times now they are telling me that they are waiting on this office to approve my pump supplies.  Thank you

## 2022-11-17 NOTE — TELEPHONE ENCOUNTER
Zohreh Gifford called from [facility]. They did not receive the form that you signed on 11/16 for a [address], can you re fax? They are also faxing another form that needs completed for mini med sensor and transmitter.      Fax [phone]  Phone [phone]

## 2023-01-16 ENCOUNTER — TELEPHONE (OUTPATIENT)
Dept: FAMILY MEDICINE CLINIC | Age: 43
End: 2023-01-16

## 2023-01-16 ENCOUNTER — OFFICE VISIT (OUTPATIENT)
Dept: FAMILY MEDICINE CLINIC | Age: 43
End: 2023-01-16
Payer: COMMERCIAL

## 2023-01-16 VITALS
OXYGEN SATURATION: 98 % | RESPIRATION RATE: 18 BRPM | TEMPERATURE: 97.8 F | HEIGHT: 72 IN | HEART RATE: 89 BPM | SYSTOLIC BLOOD PRESSURE: 130 MMHG | WEIGHT: 229 LBS | DIASTOLIC BLOOD PRESSURE: 78 MMHG | BODY MASS INDEX: 31.02 KG/M2

## 2023-01-16 DIAGNOSIS — Z00.00 PREVENTATIVE HEALTH CARE: ICD-10-CM

## 2023-01-16 DIAGNOSIS — E10.9 DIABETES MELLITUS TYPE 1, CONTROLLED, INSULIN DEPENDENT (HCC): ICD-10-CM

## 2023-01-16 DIAGNOSIS — E78.1 HYPERTRIGLYCERIDEMIA: ICD-10-CM

## 2023-01-16 DIAGNOSIS — E78.2 MIXED HYPERLIPIDEMIA: ICD-10-CM

## 2023-01-16 DIAGNOSIS — E10.9 DIABETES MELLITUS TYPE 1, CONTROLLED, INSULIN DEPENDENT (HCC): Primary | ICD-10-CM

## 2023-01-16 DIAGNOSIS — E88.09 ALPHA-1-ANTICHYMOTRYPSIN DEFICIENCY: ICD-10-CM

## 2023-01-16 LAB
ANION GAP SERPL CALCULATED.3IONS-SCNC: 12 MMOL/L (ref 7–16)
BUN BLDV-MCNC: 16 MG/DL (ref 6–20)
CALCIUM SERPL-MCNC: 9.7 MG/DL (ref 8.6–10.2)
CHLORIDE BLD-SCNC: 98 MMOL/L (ref 98–107)
CHOLESTEROL, TOTAL: 176 MG/DL (ref 0–199)
CO2: 25 MMOL/L (ref 22–29)
CREAT SERPL-MCNC: 0.8 MG/DL (ref 0.7–1.2)
GFR SERPL CREATININE-BSD FRML MDRD: >60 ML/MIN/1.73
GLUCOSE BLD-MCNC: 169 MG/DL (ref 74–99)
HBA1C MFR BLD: 7.6 % (ref 4–5.6)
HDLC SERPL-MCNC: 36 MG/DL
LDL CHOLESTEROL CALCULATED: 114 MG/DL (ref 0–99)
POTASSIUM SERPL-SCNC: 4.6 MMOL/L (ref 3.5–5)
SODIUM BLD-SCNC: 135 MMOL/L (ref 132–146)
TRIGL SERPL-MCNC: 128 MG/DL (ref 0–149)
VLDLC SERPL CALC-MCNC: 26 MG/DL

## 2023-01-16 PROCEDURE — 99214 OFFICE O/P EST MOD 30 MIN: CPT | Performed by: FAMILY MEDICINE

## 2023-01-16 SDOH — ECONOMIC STABILITY: FOOD INSECURITY: WITHIN THE PAST 12 MONTHS, YOU WORRIED THAT YOUR FOOD WOULD RUN OUT BEFORE YOU GOT MONEY TO BUY MORE.: NEVER TRUE

## 2023-01-16 SDOH — ECONOMIC STABILITY: FOOD INSECURITY: WITHIN THE PAST 12 MONTHS, THE FOOD YOU BOUGHT JUST DIDN'T LAST AND YOU DIDN'T HAVE MONEY TO GET MORE.: NEVER TRUE

## 2023-01-16 ASSESSMENT — PATIENT HEALTH QUESTIONNAIRE - PHQ9
SUM OF ALL RESPONSES TO PHQ QUESTIONS 1-9: 0
1. LITTLE INTEREST OR PLEASURE IN DOING THINGS: 0
2. FEELING DOWN, DEPRESSED OR HOPELESS: 0
SUM OF ALL RESPONSES TO PHQ QUESTIONS 1-9: 0
SUM OF ALL RESPONSES TO PHQ9 QUESTIONS 1 & 2: 0

## 2023-01-16 ASSESSMENT — SOCIAL DETERMINANTS OF HEALTH (SDOH): HOW HARD IS IT FOR YOU TO PAY FOR THE VERY BASICS LIKE FOOD, HOUSING, MEDICAL CARE, AND HEATING?: NOT HARD AT ALL

## 2023-01-16 NOTE — TELEPHONE ENCOUNTER
Pharmacist called. She said the Humalog was sent over. She needs to know the max units a day. Please advise.

## 2023-01-18 DIAGNOSIS — J31.0 CHRONIC RHINITIS: ICD-10-CM

## 2023-01-18 RX ORDER — FLUTICASONE PROPIONATE 50 MCG
SPRAY, SUSPENSION (ML) NASAL
Qty: 48 G | Refills: 3 | Status: SHIPPED | OUTPATIENT
Start: 2023-01-18

## 2023-01-18 NOTE — TELEPHONE ENCOUNTER
Spoke w/ patient, he sent a Walk-in message letting us know that max would be 209 units in a 24 hour period.

## 2023-01-18 NOTE — TELEPHONE ENCOUNTER
Patient would like to fill Flonase through [facility].      Last Appointment:  1/16/2023  No future appointments. '

## 2023-05-09 ENCOUNTER — TELEPHONE (OUTPATIENT)
Dept: FAMILY MEDICINE CLINIC | Age: 43
End: 2023-05-09

## 2023-05-09 NOTE — TELEPHONE ENCOUNTER
Devin Prater that a stool test will be mailed to him, and a brief explanation of how to do it, and then mail it to the address provided. He is agreeable to this. Placed in the mail today.

## 2023-11-17 ENCOUNTER — OFFICE VISIT (OUTPATIENT)
Dept: FAMILY MEDICINE CLINIC | Age: 43
End: 2023-11-17
Payer: COMMERCIAL

## 2023-11-17 VITALS
HEART RATE: 85 BPM | RESPIRATION RATE: 17 BRPM | TEMPERATURE: 97.7 F | SYSTOLIC BLOOD PRESSURE: 120 MMHG | DIASTOLIC BLOOD PRESSURE: 82 MMHG | OXYGEN SATURATION: 98 % | WEIGHT: 235 LBS | BODY MASS INDEX: 31.83 KG/M2 | HEIGHT: 72 IN

## 2023-11-17 DIAGNOSIS — Z00.00 PREVENTATIVE HEALTH CARE: ICD-10-CM

## 2023-11-17 DIAGNOSIS — E78.2 MIXED HYPERLIPIDEMIA: ICD-10-CM

## 2023-11-17 DIAGNOSIS — E55.9 VITAMIN D DEFICIENCY: ICD-10-CM

## 2023-11-17 DIAGNOSIS — E78.1 HYPERTRIGLYCERIDEMIA: ICD-10-CM

## 2023-11-17 DIAGNOSIS — J31.0 CHRONIC RHINITIS: ICD-10-CM

## 2023-11-17 DIAGNOSIS — E10.9 DIABETES MELLITUS TYPE 1, CONTROLLED, INSULIN DEPENDENT (HCC): ICD-10-CM

## 2023-11-17 DIAGNOSIS — F41.9 ANXIETY: ICD-10-CM

## 2023-11-17 DIAGNOSIS — Z00.00 ENCOUNTER FOR WELL ADULT EXAM WITHOUT ABNORMAL FINDINGS: Primary | ICD-10-CM

## 2023-11-17 DIAGNOSIS — Z00.00 ENCOUNTER FOR WELL ADULT EXAM WITHOUT ABNORMAL FINDINGS: ICD-10-CM

## 2023-11-17 DIAGNOSIS — Z12.11 SCREEN FOR COLON CANCER: ICD-10-CM

## 2023-11-17 LAB
ABSOLUTE IMMATURE GRANULOCYTE: <0.03 K/UL (ref 0–0.58)
ALBUMIN SERPL-MCNC: 4.3 G/DL (ref 3.5–5.2)
ALP BLD-CCNC: 102 U/L (ref 40–129)
ALT SERPL-CCNC: 25 U/L (ref 0–40)
ANION GAP SERPL CALCULATED.3IONS-SCNC: 19 MMOL/L (ref 7–16)
AST SERPL-CCNC: 27 U/L (ref 0–39)
BASOPHILS ABSOLUTE: 0.03 K/UL (ref 0–0.2)
BASOPHILS RELATIVE PERCENT: 1 % (ref 0–2)
BILIRUB SERPL-MCNC: 0.5 MG/DL (ref 0–1.2)
BUN BLDV-MCNC: 13 MG/DL (ref 6–20)
CALCIUM SERPL-MCNC: 9.6 MG/DL (ref 8.6–10.2)
CHLORIDE BLD-SCNC: 107 MMOL/L (ref 98–107)
CHOLESTEROL: 175 MG/DL
CO2: 20 MMOL/L (ref 22–29)
CREAT SERPL-MCNC: 0.8 MG/DL (ref 0.7–1.2)
EOSINOPHILS ABSOLUTE: 0.07 K/UL (ref 0.05–0.5)
EOSINOPHILS RELATIVE PERCENT: 1 % (ref 0–6)
GFR SERPL CREATININE-BSD FRML MDRD: >60 ML/MIN/1.73M2
GLUCOSE BLD-MCNC: 100 MG/DL (ref 74–99)
HBA1C MFR BLD: 7 % (ref 4–5.6)
HCT VFR BLD CALC: 47.7 % (ref 37–54)
HDLC SERPL-MCNC: 32 MG/DL
HEMOGLOBIN: 15.8 G/DL (ref 12.5–16.5)
IMMATURE GRANULOCYTES: 0 % (ref 0–5)
LDL CHOLESTEROL: 117 MG/DL
LYMPHOCYTES ABSOLUTE: 2.05 K/UL (ref 1.5–4)
LYMPHOCYTES RELATIVE PERCENT: 40 % (ref 20–42)
MCH RBC QN AUTO: 31.2 PG (ref 26–35)
MCHC RBC AUTO-ENTMCNC: 33.1 G/DL (ref 32–34.5)
MCV RBC AUTO: 94.3 FL (ref 80–99.9)
MONOCYTES ABSOLUTE: 0.34 K/UL (ref 0.1–0.95)
MONOCYTES RELATIVE PERCENT: 7 % (ref 2–12)
NEUTROPHILS ABSOLUTE: 2.61 K/UL (ref 1.8–7.3)
NEUTROPHILS RELATIVE PERCENT: 51 % (ref 43–80)
PDW BLD-RTO: 13.2 % (ref 11.5–15)
PLATELET # BLD: 333 K/UL (ref 130–450)
PMV BLD AUTO: 9.5 FL (ref 7–12)
POTASSIUM SERPL-SCNC: 4.5 MMOL/L (ref 3.5–5)
RBC # BLD: 5.06 M/UL (ref 3.8–5.8)
SODIUM BLD-SCNC: 146 MMOL/L (ref 132–146)
TOTAL PROTEIN: 7.6 G/DL (ref 6.4–8.3)
TRIGL SERPL-MCNC: 129 MG/DL
TSH SERPL DL<=0.05 MIU/L-ACNC: 1.55 UIU/ML (ref 0.27–4.2)
VITAMIN D 25-HYDROXY: 24.7 NG/ML (ref 30–100)
VLDLC SERPL CALC-MCNC: 26 MG/DL
WBC # BLD: 5.1 K/UL (ref 4.5–11.5)

## 2023-11-17 PROCEDURE — 99396 PREV VISIT EST AGE 40-64: CPT | Performed by: FAMILY MEDICINE

## 2023-11-17 RX ORDER — BUSPIRONE HYDROCHLORIDE 5 MG/1
5 TABLET ORAL 2 TIMES DAILY PRN
Qty: 60 TABLET | Refills: 0 | Status: SHIPPED | OUTPATIENT
Start: 2023-11-17 | End: 2023-12-17

## 2023-11-17 RX ORDER — INSULIN LISPRO 100 [IU]/ML
INJECTION, SOLUTION INTRAVENOUS; SUBCUTANEOUS
Qty: 32000 ML | Refills: 1 | Status: SHIPPED | OUTPATIENT
Start: 2023-11-17

## 2023-11-17 RX ORDER — FLUTICASONE PROPIONATE 50 MCG
SPRAY, SUSPENSION (ML) NASAL
Qty: 48 G | Refills: 3 | Status: SHIPPED | OUTPATIENT
Start: 2023-11-17

## 2023-11-17 NOTE — PROGRESS NOTES
findings  -     CBC with Auto Differential; Future  -     Comprehensive Metabolic Panel; Future  -     Lipid Panel; Future  -     TSH; Future  2. Chronic rhinitis  -     fluticasone (FLONASE) 50 MCG/ACT nasal spray; instill 1 spray into each nostril once daily, Disp-48 g, R-3Normal  3. Diabetes mellitus type 1, controlled, insulin dependent (HCC)  -     insulin lispro (HUMALOG) 100 UNIT/ML SOLN injection vial; USE 2.5 UNITS PER HOUR PLUS 4 TO 5 BOLUSES THROUGH THE DAY VIA INSULIN PUMP AS DIRECTED.  UNITS PER 24 HRS, Disp-70537 mL, R-1Normal  -     CBC with Auto Differential; Future  -     Comprehensive Metabolic Panel; Future  -     Hemoglobin A1C; Future  -     Lipid Panel; Future  4. Vitamin D deficiency  -     Vitamin D 25 Hydroxy; Future  5. Screen for colon cancer  -     Cologuard (Fecal DNA Colorectal Cancer Screening)  6. Preventative health care  -     CBC with Auto Differential; Future  -     Comprehensive Metabolic Panel; Future  -     Lipid Panel; Future  -     TSH; Future  7. Mixed hyperlipidemia  -     Comprehensive Metabolic Panel; Future  -     Lipid Panel; Future  8. Hypertriglyceridemia  -     Comprehensive Metabolic Panel; Future  -     Lipid Panel; Future  9. Anxiety  -     busPIRone (BUSPAR) 5 MG tablet;  Take 1 tablet by mouth 2 times daily as needed (anxiety), Disp-60 tablet, R-0Normal     Trial Buspar  Labs today  Recheck 6 months    Personalized Preventive Plan   Current Health Maintenance Status  Immunization History   Administered Date(s) Administered    Influenza Virus Vaccine 10/07/2013, 09/25/2014, 11/01/2016    Influenza, FLUARIX, FLULAVAL, FLUZONE (age 10 mo+) AND AFLURIA, (age 1 y+), PF, 0.5mL 11/08/2017, 10/05/2018, 10/04/2019    Pneumococcal, PPSV23, PNEUMOVAX 23, (age 2y+), SC/IM, 0.5mL 01/12/2017    TDaP, ADACEL (age 6y-58y), BOOSTRIX (age 10y+), IM, 0.5mL 05/31/2015        Health Maintenance   Topic Date Due    Hepatitis B vaccine (1 of 3 - 3-dose series) Never done

## 2024-05-24 DIAGNOSIS — J31.0 CHRONIC RHINITIS: ICD-10-CM

## 2024-05-24 DIAGNOSIS — E10.9 DIABETES MELLITUS TYPE 1, CONTROLLED, INSULIN DEPENDENT (HCC): ICD-10-CM

## 2024-05-24 RX ORDER — INSULIN LISPRO 100 [IU]/ML
INJECTION, SOLUTION INTRAVENOUS; SUBCUTANEOUS
Refills: 3 | OUTPATIENT
Start: 2024-05-24

## 2024-05-28 RX ORDER — FLUTICASONE PROPIONATE 50 MCG
SPRAY, SUSPENSION (ML) NASAL
Qty: 48 G | Refills: 3 | Status: SHIPPED | OUTPATIENT
Start: 2024-05-28

## 2024-05-28 RX ORDER — INSULIN LISPRO 100 [IU]/ML
INJECTION, SOLUTION INTRAVENOUS; SUBCUTANEOUS
Qty: 32000 ML | Refills: 1 | Status: SHIPPED | OUTPATIENT
Start: 2024-05-28

## 2024-06-01 SDOH — ECONOMIC STABILITY: FOOD INSECURITY: WITHIN THE PAST 12 MONTHS, THE FOOD YOU BOUGHT JUST DIDN'T LAST AND YOU DIDN'T HAVE MONEY TO GET MORE.: NEVER TRUE

## 2024-06-01 SDOH — ECONOMIC STABILITY: FOOD INSECURITY: WITHIN THE PAST 12 MONTHS, YOU WORRIED THAT YOUR FOOD WOULD RUN OUT BEFORE YOU GOT MONEY TO BUY MORE.: NEVER TRUE

## 2024-06-01 SDOH — ECONOMIC STABILITY: HOUSING INSECURITY
IN THE LAST 12 MONTHS, WAS THERE A TIME WHEN YOU DID NOT HAVE A STEADY PLACE TO SLEEP OR SLEPT IN A SHELTER (INCLUDING NOW)?: NO

## 2024-06-01 SDOH — ECONOMIC STABILITY: TRANSPORTATION INSECURITY
IN THE PAST 12 MONTHS, HAS LACK OF TRANSPORTATION KEPT YOU FROM MEETINGS, WORK, OR FROM GETTING THINGS NEEDED FOR DAILY LIVING?: NO

## 2024-06-01 SDOH — ECONOMIC STABILITY: INCOME INSECURITY: HOW HARD IS IT FOR YOU TO PAY FOR THE VERY BASICS LIKE FOOD, HOUSING, MEDICAL CARE, AND HEATING?: NOT HARD AT ALL

## 2024-06-01 ASSESSMENT — PATIENT HEALTH QUESTIONNAIRE - PHQ9
SUM OF ALL RESPONSES TO PHQ QUESTIONS 1-9: 0
2. FEELING DOWN, DEPRESSED OR HOPELESS: NOT AT ALL
SUM OF ALL RESPONSES TO PHQ QUESTIONS 1-9: 0
SUM OF ALL RESPONSES TO PHQ QUESTIONS 1-9: 0
2. FEELING DOWN, DEPRESSED OR HOPELESS: NOT AT ALL
1. LITTLE INTEREST OR PLEASURE IN DOING THINGS: NOT AT ALL
SUM OF ALL RESPONSES TO PHQ9 QUESTIONS 1 & 2: 0
SUM OF ALL RESPONSES TO PHQ QUESTIONS 1-9: 0
SUM OF ALL RESPONSES TO PHQ9 QUESTIONS 1 & 2: 0
1. LITTLE INTEREST OR PLEASURE IN DOING THINGS: NOT AT ALL

## 2024-06-04 ENCOUNTER — OFFICE VISIT (OUTPATIENT)
Dept: FAMILY MEDICINE CLINIC | Age: 44
End: 2024-06-04
Payer: COMMERCIAL

## 2024-06-04 VITALS
HEART RATE: 91 BPM | OXYGEN SATURATION: 95 % | BODY MASS INDEX: 32.37 KG/M2 | TEMPERATURE: 98.2 F | WEIGHT: 239 LBS | DIASTOLIC BLOOD PRESSURE: 84 MMHG | RESPIRATION RATE: 15 BRPM | HEIGHT: 72 IN | SYSTOLIC BLOOD PRESSURE: 122 MMHG

## 2024-06-04 DIAGNOSIS — Z00.00 PREVENTATIVE HEALTH CARE: ICD-10-CM

## 2024-06-04 DIAGNOSIS — Z12.11 SCREEN FOR COLON CANCER: ICD-10-CM

## 2024-06-04 DIAGNOSIS — F41.9 ANXIETY: ICD-10-CM

## 2024-06-04 DIAGNOSIS — E78.2 MIXED HYPERLIPIDEMIA: ICD-10-CM

## 2024-06-04 DIAGNOSIS — E10.9 DIABETES MELLITUS TYPE 1, CONTROLLED, INSULIN DEPENDENT (HCC): ICD-10-CM

## 2024-06-04 DIAGNOSIS — E78.1 HYPERTRIGLYCERIDEMIA: ICD-10-CM

## 2024-06-04 DIAGNOSIS — E10.9 DIABETES MELLITUS TYPE 1, CONTROLLED, INSULIN DEPENDENT (HCC): Primary | ICD-10-CM

## 2024-06-04 DIAGNOSIS — E55.9 VITAMIN D DEFICIENCY: ICD-10-CM

## 2024-06-04 DIAGNOSIS — R53.82 CHRONIC FATIGUE: ICD-10-CM

## 2024-06-04 LAB
ALBUMIN: 4.6 G/DL (ref 3.5–5.2)
ALP BLD-CCNC: 120 U/L (ref 40–129)
ALT SERPL-CCNC: 29 U/L (ref 0–40)
ANION GAP SERPL CALCULATED.3IONS-SCNC: 14 MMOL/L (ref 7–16)
AST SERPL-CCNC: 27 U/L (ref 0–39)
BILIRUB SERPL-MCNC: 0.5 MG/DL (ref 0–1.2)
BUN BLDV-MCNC: 13 MG/DL (ref 6–20)
CALCIUM SERPL-MCNC: 9.5 MG/DL (ref 8.6–10.2)
CHLORIDE BLD-SCNC: 101 MMOL/L (ref 98–107)
CHOLESTEROL, TOTAL: 203 MG/DL
CO2: 23 MMOL/L (ref 22–29)
CORTISOL COLLECTION INFO: NORMAL
CORTISOL: 12.2 UG/DL (ref 2.7–18.4)
CREAT SERPL-MCNC: 0.8 MG/DL (ref 0.7–1.2)
CREATININE URINE: 76 MG/DL (ref 40–278)
GFR, ESTIMATED: >90 ML/MIN/1.73M2
GLUCOSE BLD-MCNC: 170 MG/DL (ref 74–99)
HBA1C MFR BLD: 7.2 %
HDLC SERPL-MCNC: 35 MG/DL
LDL CHOLESTEROL: 129 MG/DL
MICROALBUMIN/CREAT 24H UR: <12 MG/L (ref 0–19)
MICROALBUMIN/CREAT UR-RTO: NORMAL MCG/MG CREAT (ref 0–30)
POTASSIUM SERPL-SCNC: 4.7 MMOL/L (ref 3.5–5)
SODIUM BLD-SCNC: 138 MMOL/L (ref 132–146)
TOTAL PROTEIN: 7.7 G/DL (ref 6.4–8.3)
TRIGL SERPL-MCNC: 196 MG/DL
TSH SERPL DL<=0.05 MIU/L-ACNC: 2.09 UIU/ML (ref 0.27–4.2)
VITAMIN B-12: 588 PG/ML (ref 211–946)
VITAMIN D 25-HYDROXY: 29.8 NG/ML (ref 30–100)
VLDLC SERPL CALC-MCNC: 39 MG/DL

## 2024-06-04 PROCEDURE — 99214 OFFICE O/P EST MOD 30 MIN: CPT | Performed by: FAMILY MEDICINE

## 2024-06-04 PROCEDURE — 83036 HEMOGLOBIN GLYCOSYLATED A1C: CPT | Performed by: FAMILY MEDICINE

## 2024-06-04 PROCEDURE — G2211 COMPLEX E/M VISIT ADD ON: HCPCS | Performed by: FAMILY MEDICINE

## 2024-06-04 RX ORDER — ACYCLOVIR 400 MG/1
TABLET ORAL
Qty: 9 EACH | Refills: 5 | Status: SHIPPED | OUTPATIENT
Start: 2024-06-04

## 2024-06-04 RX ORDER — ACYCLOVIR 400 MG/1
TABLET ORAL
Qty: 1 EACH | Refills: 0 | Status: SHIPPED | OUTPATIENT
Start: 2024-06-04

## 2024-06-04 NOTE — PROGRESS NOTES
[facility]    Juan Pablo Fajardo  presents to the office today for No chief complaint on file.    Type 1 Diabetes  Dx age 6  Using insulin pump  Typically well controlled  Eye exam up to date  He does have diabetic retinopathy - Dr. Servin   A1C 7.2  Refuses statin    Fatigue  Doesn't snore  Seeks AM cortisol level  No ED    GERD  Taking nexium as Rx       3 adult children     Works at [employer]     Chewing tobacco  Knows he should quit  Aware he should be going to dentist regularly     Anxiety  Seeks occasional medication  Hydroxyzine PRN not that helpful  He does not like medication so refuses    Review of Systems     /84   Pulse 91   Temp 98.2 °F (36.8 °C) (Temporal)   Resp 15   Ht 1.829 m (6')   Wt 108.4 kg (239 lb)   SpO2 95%   BMI 32.41 kg/m²   Physical Exam  Constitutional:       Appearance: Normal appearance.   HENT:      Head: Normocephalic and atraumatic.   Eyes:      Extraocular Movements: Extraocular movements intact.      Conjunctiva/sclera: Conjunctivae normal.   Cardiovascular:      Rate and Rhythm: Normal rate.      Heart sounds: Normal heart sounds.   Pulmonary:      Effort: Pulmonary effort is normal.      Breath sounds: Normal breath sounds.   Skin:     General: Skin is warm.   Neurological:      Mental Status: He is alert and oriented to person, place, and time.   Psychiatric:         Mood and Affect: Mood normal.         Behavior: Behavior normal.            Current Outpatient Medications:     Continuous Glucose Sensor (DEXCOM G7 SENSOR) MISC, Use as directed, Disp: 9 each, Rfl: 5    Continuous Glucose  (DEXCOM G7 ) DURAN, Use as directed, Disp: 1 each, Rfl: 0    blood glucose test strips (ASCENSIA AUTODISC VI;ONE TOUCH ULTRA TEST VI) strip, Test strips to use 8-10 times a day for control of type 1 brittle diabetes, Disp: 900 each, Rfl: 1    fluticasone (FLONASE) 50 MCG/ACT nasal spray, instill 1 spray into each nostril once daily,

## 2024-06-06 LAB
SEX HORMONE BINDING GLOBULIN: 85 NMOL/L (ref 17–56)
TESTOSTERONE FREE-NONMALE: 119.3 PG/ML (ref 47–244)
TESTOSTERONE TOTAL: 997 NG/DL (ref 249–836)
TESTOSTERONE, BIOAVAILABLE: 279.6 NG/DL (ref 130–680)

## 2025-03-31 DIAGNOSIS — E10.9 DIABETES MELLITUS TYPE 1, CONTROLLED, INSULIN DEPENDENT (HCC): ICD-10-CM

## 2025-03-31 RX ORDER — INSULIN LISPRO 100 [IU]/ML
INJECTION, SOLUTION INTRAVENOUS; SUBCUTANEOUS
Qty: 190 ML | Refills: 0 | Status: SHIPPED | OUTPATIENT
Start: 2025-03-31

## 2025-04-08 ASSESSMENT — PATIENT HEALTH QUESTIONNAIRE - PHQ9
2. FEELING DOWN, DEPRESSED OR HOPELESS: NOT AT ALL
SUM OF ALL RESPONSES TO PHQ QUESTIONS 1-9: 0
1. LITTLE INTEREST OR PLEASURE IN DOING THINGS: NOT AT ALL
SUM OF ALL RESPONSES TO PHQ QUESTIONS 1-9: 0
2. FEELING DOWN, DEPRESSED OR HOPELESS: NOT AT ALL
SUM OF ALL RESPONSES TO PHQ9 QUESTIONS 1 & 2: 0
1. LITTLE INTEREST OR PLEASURE IN DOING THINGS: NOT AT ALL

## 2025-04-09 ENCOUNTER — OFFICE VISIT (OUTPATIENT)
Dept: FAMILY MEDICINE CLINIC | Age: 45
End: 2025-04-09
Payer: COMMERCIAL

## 2025-04-09 VITALS
OXYGEN SATURATION: 98 % | DIASTOLIC BLOOD PRESSURE: 80 MMHG | BODY MASS INDEX: 32.23 KG/M2 | HEART RATE: 87 BPM | WEIGHT: 238 LBS | RESPIRATION RATE: 16 BRPM | TEMPERATURE: 97.6 F | HEIGHT: 72 IN | SYSTOLIC BLOOD PRESSURE: 120 MMHG

## 2025-04-09 DIAGNOSIS — Z12.11 SCREEN FOR COLON CANCER: ICD-10-CM

## 2025-04-09 DIAGNOSIS — E78.1 HYPERTRIGLYCERIDEMIA: ICD-10-CM

## 2025-04-09 DIAGNOSIS — Z00.00 ENCOUNTER FOR WELL ADULT EXAM WITHOUT ABNORMAL FINDINGS: ICD-10-CM

## 2025-04-09 DIAGNOSIS — E78.2 MIXED HYPERLIPIDEMIA: ICD-10-CM

## 2025-04-09 DIAGNOSIS — E55.9 VITAMIN D DEFICIENCY: ICD-10-CM

## 2025-04-09 DIAGNOSIS — E10.9 DIABETES MELLITUS TYPE 1, CONTROLLED, INSULIN DEPENDENT (HCC): ICD-10-CM

## 2025-04-09 DIAGNOSIS — Z00.00 ENCOUNTER FOR WELL ADULT EXAM WITHOUT ABNORMAL FINDINGS: Primary | ICD-10-CM

## 2025-04-09 LAB
ALBUMIN: 4.7 G/DL (ref 3.5–5.2)
ALP BLD-CCNC: 115 U/L (ref 40–129)
ALT SERPL-CCNC: 31 U/L (ref 0–40)
ANION GAP SERPL CALCULATED.3IONS-SCNC: 12 MMOL/L (ref 7–16)
AST SERPL-CCNC: 26 U/L (ref 0–39)
BASOPHILS ABSOLUTE: 0.02 K/UL (ref 0–0.2)
BASOPHILS RELATIVE PERCENT: 0 % (ref 0–2)
BILIRUB SERPL-MCNC: 0.6 MG/DL (ref 0–1.2)
BUN BLDV-MCNC: 15 MG/DL (ref 6–20)
CALCIUM SERPL-MCNC: 9.7 MG/DL (ref 8.6–10.2)
CHLORIDE BLD-SCNC: 104 MMOL/L (ref 98–107)
CHOLESTEROL, TOTAL: 231 MG/DL
CO2: 24 MMOL/L (ref 22–29)
CREAT SERPL-MCNC: 0.9 MG/DL (ref 0.7–1.2)
CREATININE URINE: 95.9 MG/DL (ref 40–278)
EOSINOPHILS ABSOLUTE: 0.05 K/UL (ref 0.05–0.5)
EOSINOPHILS RELATIVE PERCENT: 1 % (ref 0–6)
GFR, ESTIMATED: >90 ML/MIN/1.73M2
GLUCOSE BLD-MCNC: 104 MG/DL (ref 74–99)
HBA1C MFR BLD: 7 %
HCT VFR BLD CALC: 50.4 % (ref 37–54)
HDLC SERPL-MCNC: 35 MG/DL
HEMOGLOBIN: 16.7 G/DL (ref 12.5–16.5)
IMMATURE GRANULOCYTES %: 0 % (ref 0–5)
IMMATURE GRANULOCYTES ABSOLUTE: <0.03 K/UL (ref 0–0.58)
LDL CHOLESTEROL: 158 MG/DL
LYMPHOCYTES ABSOLUTE: 1.98 K/UL (ref 1.5–4)
LYMPHOCYTES RELATIVE PERCENT: 38 % (ref 20–42)
MCH RBC QN AUTO: 30.8 PG (ref 26–35)
MCHC RBC AUTO-ENTMCNC: 33.1 G/DL (ref 32–34.5)
MCV RBC AUTO: 93 FL (ref 80–99.9)
MICROALBUMIN/CREAT 24H UR: <12 MG/L (ref 0–19)
MICROALBUMIN/CREAT UR-RTO: NORMAL MCG/MG CREAT (ref 0–30)
MONOCYTES ABSOLUTE: 0.46 K/UL (ref 0.1–0.95)
MONOCYTES RELATIVE PERCENT: 9 % (ref 2–12)
NEUTROPHILS ABSOLUTE: 2.72 K/UL (ref 1.8–7.3)
NEUTROPHILS RELATIVE PERCENT: 52 % (ref 43–80)
PDW BLD-RTO: 13.2 % (ref 11.5–15)
PLATELET # BLD: 329 K/UL (ref 130–450)
PMV BLD AUTO: 9.9 FL (ref 7–12)
POTASSIUM SERPL-SCNC: 4.5 MMOL/L (ref 3.5–5)
RBC # BLD: 5.42 M/UL (ref 3.8–5.8)
SODIUM BLD-SCNC: 140 MMOL/L (ref 132–146)
TOTAL PROTEIN: 7.8 G/DL (ref 6.4–8.3)
TRIGL SERPL-MCNC: 192 MG/DL
TSH SERPL DL<=0.05 MIU/L-ACNC: 2.02 UIU/ML (ref 0.27–4.2)
VITAMIN B-12: 558 PG/ML (ref 211–946)
VITAMIN D 25-HYDROXY: 12.3 NG/ML (ref 30–100)
VLDLC SERPL CALC-MCNC: 38 MG/DL
WBC # BLD: 5.3 K/UL (ref 4.5–11.5)

## 2025-04-09 PROCEDURE — 83036 HEMOGLOBIN GLYCOSYLATED A1C: CPT | Performed by: FAMILY MEDICINE

## 2025-04-09 PROCEDURE — 99396 PREV VISIT EST AGE 40-64: CPT | Performed by: FAMILY MEDICINE

## 2025-04-09 RX ORDER — ACYCLOVIR 400 MG/1
TABLET ORAL
Qty: 9 EACH | Refills: 5 | Status: SHIPPED | OUTPATIENT
Start: 2025-04-09

## 2025-04-09 SDOH — ECONOMIC STABILITY: FOOD INSECURITY: WITHIN THE PAST 12 MONTHS, YOU WORRIED THAT YOUR FOOD WOULD RUN OUT BEFORE YOU GOT MONEY TO BUY MORE.: NEVER TRUE

## 2025-04-09 SDOH — ECONOMIC STABILITY: FOOD INSECURITY: WITHIN THE PAST 12 MONTHS, THE FOOD YOU BOUGHT JUST DIDN'T LAST AND YOU DIDN'T HAVE MONEY TO GET MORE.: NEVER TRUE

## 2025-04-09 NOTE — PATIENT INSTRUCTIONS
hands, brush your teeth twice a day, and wear a seat belt in the car.   Where can you learn more?  Go to [email or web address].net/patientEd and enter P072 to learn more about \"Well Visit, Ages 18 to 65: Care Instructions.\"  Current as of: April 30, 2024  Content Version: 14.4  © 6271-3324 [employer].   Care instructions adapted under license by [employer]. If you have questions about a medical condition or this instruction, always ask your healthcare professional. [employer] disclaims any warranty or liability for your use of this information.

## 2025-04-09 NOTE — PROGRESS NOTES
Well Adult Note  Name: Juan Pablo Fajardo Jr. Today’s Date: 2025   MRN: [id number] Sex: Male   Age: 44 y.o. Ethnicity: Non- / Non    : 1980 Race: White (non-)      Juan Pablo Fajardo Jr. is here for a well adult exam.          Assessment & Plan  1. Health maintenance.  - Due for colorectal cancer screening.  - Cologuard test kit was supposed to be sent to his home but was not received.  - Cologuard test kit will be ordered and sent to his home for colorectal cancer screening.  - Discussed the importance of screening and the typical results at his age.    2. Diabetes mellitus.  - A1c level is currently at 7.0, indicating good glycemic control.  - Prescriptions for Dexcom G7 sensors will be sent to [facility].  - Prescription for Tandem T:slim X2 insulin pump AutoSoft 90 degrees soft cannula reservoir and infusion sets, 43 inches long, will be provided, with instructions to dispense 30 units with 3 refills; this prescription will be sent to [facility].  - Fasting labs and a urine sample have been ordered for today.    Encounter for well adult exam without abnormal findings  -     Continuous Glucose Sensor (DEXCOM G7 SENSOR) MISC; Use as directed, Disp-9 each, R-5Normal  -     CBC with Auto Differential; Future  -     Comprehensive Metabolic Panel; Future  -     Lipid Panel; Future  -     Albumin/Creatinine Ratio, Urine; Future  -     TSH; Future  -     Vitamin B12; Future  -     Vitamin D 25 Hydroxy; Future  Diabetes mellitus type 1, controlled, insulin dependent (HCC)  -     POCT glycosylated hemoglobin (Hb A1C)  -     Continuous Glucose Sensor (DEXCOM G7 SENSOR) MISC; Use as directed, Disp-9 each, R-5Normal  -     CBC with Auto Differential; Future  -     Comprehensive Metabolic Panel; Future  -     Lipid Panel; Future  -     Albumin/Creatinine Ratio, Urine; Future  -     TSH; Future  -     Vitamin B12; Future  -     Vitamin D 25 Hydroxy; Future  -     Insulin Infusion Pump KIT;

## 2025-04-11 ENCOUNTER — RESULTS FOLLOW-UP (OUTPATIENT)
Dept: FAMILY MEDICINE CLINIC | Age: 45
End: 2025-04-11

## 2025-04-29 ENCOUNTER — PATIENT MESSAGE (OUTPATIENT)
Dept: FAMILY MEDICINE CLINIC | Age: 45
End: 2025-04-29

## 2025-06-30 DIAGNOSIS — E10.9 DIABETES MELLITUS TYPE 1, CONTROLLED, INSULIN DEPENDENT (HCC): ICD-10-CM

## 2025-08-04 DIAGNOSIS — E10.9 DIABETES MELLITUS TYPE 1, CONTROLLED, INSULIN DEPENDENT (HCC): ICD-10-CM

## 2025-08-06 RX ORDER — INSULIN LISPRO 100 [IU]/ML
INJECTION, SOLUTION INTRAVENOUS; SUBCUTANEOUS
Qty: 190 ML | Refills: 0 | Status: SHIPPED | OUTPATIENT
Start: 2025-08-06